# Patient Record
Sex: FEMALE | Race: WHITE | NOT HISPANIC OR LATINO | URBAN - METROPOLITAN AREA
[De-identification: names, ages, dates, MRNs, and addresses within clinical notes are randomized per-mention and may not be internally consistent; named-entity substitution may affect disease eponyms.]

---

## 2021-10-28 ENCOUNTER — APPOINTMENT (RX ONLY)
Dept: URBAN - METROPOLITAN AREA CLINIC 374 | Facility: CLINIC | Age: 46
Setting detail: DERMATOLOGY
End: 2021-10-28

## 2021-10-28 DIAGNOSIS — L91.8 OTHER HYPERTROPHIC DISORDERS OF THE SKIN: ICD-10-CM

## 2021-10-28 DIAGNOSIS — D22 MELANOCYTIC NEVI: ICD-10-CM

## 2021-10-28 DIAGNOSIS — L82.0 INFLAMED SEBORRHEIC KERATOSIS: ICD-10-CM

## 2021-10-28 DIAGNOSIS — D18.0 HEMANGIOMA: ICD-10-CM

## 2021-10-28 DIAGNOSIS — L81.4 OTHER MELANIN HYPERPIGMENTATION: ICD-10-CM

## 2021-10-28 DIAGNOSIS — Z71.89 OTHER SPECIFIED COUNSELING: ICD-10-CM

## 2021-10-28 PROBLEM — D22.71 MELANOCYTIC NEVI OF RIGHT LOWER LIMB, INCLUDING HIP: Status: ACTIVE | Noted: 2021-10-28

## 2021-10-28 PROBLEM — D22.5 MELANOCYTIC NEVI OF TRUNK: Status: ACTIVE | Noted: 2021-10-28

## 2021-10-28 PROBLEM — D22.62 MELANOCYTIC NEVI OF LEFT UPPER LIMB, INCLUDING SHOULDER: Status: ACTIVE | Noted: 2021-10-28

## 2021-10-28 PROBLEM — D18.01 HEMANGIOMA OF SKIN AND SUBCUTANEOUS TISSUE: Status: ACTIVE | Noted: 2021-10-28

## 2021-10-28 PROBLEM — D22.72 MELANOCYTIC NEVI OF LEFT LOWER LIMB, INCLUDING HIP: Status: ACTIVE | Noted: 2021-10-28

## 2021-10-28 PROBLEM — D22.61 MELANOCYTIC NEVI OF RIGHT UPPER LIMB, INCLUDING SHOULDER: Status: ACTIVE | Noted: 2021-10-28

## 2021-10-28 PROCEDURE — ? SHAVE REMOVAL

## 2021-10-28 PROCEDURE — ? COUNSELING

## 2021-10-28 PROCEDURE — ? PHOTO-DOCUMENTATION

## 2021-10-28 PROCEDURE — 11301 SHAVE SKIN LESION 0.6-1.0 CM: CPT | Mod: 59

## 2021-10-28 PROCEDURE — 11302 SHAVE SKIN LESION 1.1-2.0 CM: CPT | Mod: 59

## 2021-10-28 PROCEDURE — ? SUNSCREEN RECOMMENDATIONS

## 2021-10-28 PROCEDURE — 99203 OFFICE O/P NEW LOW 30 MIN: CPT | Mod: 25

## 2021-10-28 PROCEDURE — 11200 RMVL SKIN TAGS UP TO&INC 15: CPT

## 2021-10-28 PROCEDURE — ? FULL BODY SKIN EXAM

## 2021-10-28 PROCEDURE — ? SKIN TAG REMOVAL

## 2021-10-28 ASSESSMENT — LOCATION ZONE DERM
LOCATION ZONE: TRUNK
LOCATION ZONE: ARM
LOCATION ZONE: LEG

## 2021-10-28 ASSESSMENT — LOCATION SIMPLE DESCRIPTION DERM
LOCATION SIMPLE: RIGHT THIGH
LOCATION SIMPLE: LOWER BACK
LOCATION SIMPLE: LEFT THIGH
LOCATION SIMPLE: LEFT UPPER BACK
LOCATION SIMPLE: LEFT UPPER ARM
LOCATION SIMPLE: RIGHT SHOULDER
LOCATION SIMPLE: CHEST
LOCATION SIMPLE: ABDOMEN
LOCATION SIMPLE: UPPER BACK
LOCATION SIMPLE: RIGHT UPPER ARM
LOCATION SIMPLE: LEFT SHOULDER

## 2021-10-28 ASSESSMENT — LOCATION DETAILED DESCRIPTION DERM
LOCATION DETAILED: LEFT ANTERIOR DISTAL THIGH
LOCATION DETAILED: LEFT LATERAL SUPERIOR CHEST
LOCATION DETAILED: EPIGASTRIC SKIN
LOCATION DETAILED: LEFT ANTERIOR PROXIMAL THIGH
LOCATION DETAILED: RIGHT LATERAL ABDOMEN
LOCATION DETAILED: SUPERIOR LUMBAR SPINE
LOCATION DETAILED: LEFT ANTERIOR PROXIMAL UPPER ARM
LOCATION DETAILED: RIGHT RIB CAGE
LOCATION DETAILED: RIGHT ANTERIOR DISTAL THIGH
LOCATION DETAILED: LEFT POSTERIOR SHOULDER
LOCATION DETAILED: LEFT INFERIOR LATERAL UPPER BACK
LOCATION DETAILED: INFERIOR THORACIC SPINE
LOCATION DETAILED: LEFT MEDIAL UPPER BACK
LOCATION DETAILED: RIGHT POSTERIOR SHOULDER
LOCATION DETAILED: SUPERIOR THORACIC SPINE
LOCATION DETAILED: LEFT RIB CAGE
LOCATION DETAILED: RIGHT ANTERIOR DISTAL UPPER ARM
LOCATION DETAILED: RIGHT ANTERIOR PROXIMAL UPPER ARM
LOCATION DETAILED: UPPER STERNUM
LOCATION DETAILED: LEFT MID-UPPER BACK
LOCATION DETAILED: RIGHT MEDIAL SUPERIOR CHEST
LOCATION DETAILED: RIGHT ANTERIOR PROXIMAL THIGH
LOCATION DETAILED: PERIUMBILICAL SKIN

## 2021-10-28 NOTE — PROCEDURE: REASSURANCE
[facility] [address] [facility] 
[phone] Patient: Romana Prow MRN: [id number] :1997 My Medications ASK your physician about these medications Instructions Each Dose to Equal  
 Morning Noon Evening Bedtime  
 cyclobenzaprine 10 mg tablet Commonly known as:  FLEXERIL Your last dose was: Your next dose is: Take 0.5 Tabs by mouth nightly. 5 mg PNV66-Iron Fumarate-FA-DSS-DHA 26-1.2- mg Cap Your last dose was: Your next dose is: Take 1 Tab by mouth daily. Indications: Pregnancy 1 Tab
Detail Level: Zone
Hide Additional Notes?: No

## 2021-10-28 NOTE — PROCEDURE: FULL BODY SKIN EXAM
Detail Level: Generalized
Instructions: This plan will send the code FBSE to the PM system.  DO NOT or CHANGE the price.
Price (Do Not Change): 0.00
Body Of Note (Please Add Your Own Text Here): monitor annually

## 2021-10-28 NOTE — PROCEDURE: SHAVE REMOVAL
Medical Necessity Information: It is in your best interest to select a reason for this procedure from the list below. All of these items fulfill various CMS LCD requirements except the new and changing color options.
Medical Necessity Clause: This procedure was medically necessary because the lesion that was treated was:
Lab: -[id number]
Lab Facility: 0
Detail Level: Detailed
Was A Bandage Applied: Yes
Size Of Lesion In Cm (Required): 1.2
Biopsy Method: Dermablade
Anesthesia Type: 1% lidocaine with epinephrine
Hemostasis: Aluminum Chloride and Electrocautery
Wound Care: Petrolatum
Render Path Notes In Note?: No
Consent was obtained from the patient. The risks and benefits to therapy were discussed in detail. Specifically, the risks of infection, scarring, bleeding, prolonged wound healing, incomplete removal, allergy to anesthesia, nerve injury and recurrence were addressed. Prior to the procedure, the treatment site was clearly identified and confirmed by the patient. All components of Universal Protocol/PAUSE Rule completed.
Post-Care Instructions: I reviewed with the patient in detail post-care instructions. Patient is to keep the biopsy site dry overnight, and then apply bacitracin twice daily until healed. Patient may apply hydrogen peroxide soaks to remove any crusting.
Notification Instructions: Patient will be notified of pathology results. However, patient instructed to call the office if not contacted within 2 weeks.
Billing Type: Third-Party Bill
Size Of Lesion In Cm (Required): 1

## 2021-10-28 NOTE — PROCEDURE: SKIN TAG REMOVAL
Medical Necessity Information: It is in your best interest to select a reason for this procedure from the list below. All of these items fulfill various CMS LCD requirements except the new and changing color options.
Add Associated Diagnoses If Applicable When Selecting Medical Necessity: Yes
Include Z78.9 (Other Specified Conditions Influencing Health Status) As An Associated Diagnosis?: No
Detail Level: Zone
Medical Necessity Clause: This procedure was medically necessary because the lesions that were treated were:
Anesthesia Type: 1% lidocaine with epinephrine
Consent: Written consent obtained and the risks of skin tag removal was reviewed with the patient including but not limited to bleeding, pigmentary change, infection, pain, and remote possibility of scarring.
Anesthesia Volume In Cc: 0

## 2023-06-05 ENCOUNTER — OFFICE VISIT (OUTPATIENT)
Dept: FAMILY MEDICINE | Facility: CLINIC | Age: 48
End: 2023-06-05
Payer: COMMERCIAL

## 2023-06-05 VITALS
DIASTOLIC BLOOD PRESSURE: 88 MMHG | HEIGHT: 68 IN | TEMPERATURE: 98.1 F | WEIGHT: 242.6 LBS | SYSTOLIC BLOOD PRESSURE: 125 MMHG | HEART RATE: 84 BPM | OXYGEN SATURATION: 97 % | BODY MASS INDEX: 36.77 KG/M2

## 2023-06-05 DIAGNOSIS — Z12.31 SCREENING MAMMOGRAM FOR BREAST CANCER: ICD-10-CM

## 2023-06-05 DIAGNOSIS — Z11.4 SCREENING FOR HIV (HUMAN IMMUNODEFICIENCY VIRUS): ICD-10-CM

## 2023-06-05 DIAGNOSIS — Z01.00 ROUTINE EYE EXAM: ICD-10-CM

## 2023-06-05 DIAGNOSIS — Z13.220 SCREENING FOR LIPID DISORDERS: ICD-10-CM

## 2023-06-05 DIAGNOSIS — M79.89 LEG SWELLING: ICD-10-CM

## 2023-06-05 DIAGNOSIS — Z11.59 NEED FOR HEPATITIS C SCREENING TEST: ICD-10-CM

## 2023-06-05 DIAGNOSIS — Z00.00 ENCOUNTER FOR GENERAL ADULT MEDICAL EXAMINATION WITHOUT ABNORMAL FINDINGS: Primary | ICD-10-CM

## 2023-06-05 DIAGNOSIS — Z12.11 SCREENING FOR COLON CANCER: ICD-10-CM

## 2023-06-05 PROCEDURE — 3008F BODY MASS INDEX DOCD: CPT

## 2023-06-05 PROCEDURE — 90715 TDAP VACCINE 7 YRS/> IM: CPT

## 2023-06-05 PROCEDURE — 99386 PREV VISIT NEW AGE 40-64: CPT | Mod: 25

## 2023-06-05 PROCEDURE — 90471 IMMUNIZATION ADMIN: CPT

## 2023-06-05 ASSESSMENT — ENCOUNTER SYMPTOMS
EYES NEGATIVE: 1
NEUROLOGICAL NEGATIVE: 1
SLEEP DISTURBANCE: 1
MUSCULOSKELETAL NEGATIVE: 1
RESPIRATORY NEGATIVE: 1
ENDOCRINE NEGATIVE: 1
CONSTITUTIONAL NEGATIVE: 1
ALLERGIC/IMMUNOLOGIC NEGATIVE: 1
GASTROINTESTINAL NEGATIVE: 1
HEMATOLOGIC/LYMPHATIC NEGATIVE: 1

## 2023-06-05 ASSESSMENT — PATIENT HEALTH QUESTIONNAIRE - PHQ9: SUM OF ALL RESPONSES TO PHQ9 QUESTIONS 1 & 2: 0

## 2023-06-05 NOTE — PROGRESS NOTES
[facility]  [address] [place] [address]  [phone]       Reason for visit:   Chief Complaint   Patient presents with   • Annual Exam      HPI   Sheba Bernal is a 47 y.o. female who presents for her annual exam, new patient.    She reports being under lots of stress recently,  with MS, had recent heart attack in fall . Feels she is going through perimenopause due to hormonal changes, moods, not sleeping well. She also reports b/l ankle swelling for a few years, worse with warmer weather.     Diet - could be better, eating with stress  Exercise - walks  Smoke No   Alcohol No   Drugs No   Lives with , 2 kids  Work teacher    OBGYN:   Mammogram Due Yes   Pap Smear Due No   Colonoscopy Yes   TDAP Due Yes   Flu Due No   COVID Vaccination Due No   Lipids Due Yes    History reviewed. No pertinent past medical history.  Past Surgical History:   Procedure Laterality Date   •  SECTION      x 2     Social History     Tobacco Use   • Smoking status: Never   • Smokeless tobacco: Never   Vaping Use   • Vaping Use: Never used   Substance Use Topics   • Alcohol use: Never   • Drug use: Never     Family History   Problem Relation Age of Onset   • Hypertension Biological Mother    • Diabetes Biological Father    • Heart attack Maternal Grandfather 53   • Parkinsonism Paternal Grandfather      Patient has no known allergies.  No current outpatient medications on file.     No current facility-administered medications for this visit.       Review of Systems   Constitutional: Negative.    HENT: Negative.    Eyes: Negative.    Respiratory: Negative.    Cardiovascular: Positive for leg swelling.   Gastrointestinal: Negative.    Endocrine: Negative.    Genitourinary: Negative.    Musculoskeletal: Negative.    Skin: Negative.    Allergic/Immunologic: Negative.    Neurological: Negative.    Hematological: Negative.    Psychiatric/Behavioral: Positive for sleep  "disturbance.     Objective   Vitals:    23 0859   BP: 125/88   BP Location: Right upper arm   Patient Position: Sitting   Pulse: 84   Temp: 36.7 °C (98.1 °F)   TempSrc: Oral   SpO2: 97%   Weight: 110 kg (242 lb 9.6 oz)   Height: 1.727 m (5' 8\")       Physical Exam  Constitutional:       General: She is not in acute distress.     Appearance: Normal appearance. She is not ill-appearing, toxic-appearing or diaphoretic.   HENT:      Head: Normocephalic and atraumatic.      Right Ear: Tympanic membrane normal.      Left Ear: Tympanic membrane normal.      Nose: Nose normal.      Mouth/Throat:      Mouth: Mucous membranes are moist.      Pharynx: Oropharynx is clear.   Eyes:      Extraocular Movements: Extraocular movements intact.      Conjunctiva/sclera: Conjunctivae normal.      Pupils: Pupils are equal, round, and reactive to light.   Neck:      Thyroid: No thyromegaly.   Cardiovascular:      Rate and Rhythm: Normal rate and regular rhythm.      Heart sounds: Normal heart sounds. No murmur heard.  Pulmonary:      Effort: Pulmonary effort is normal. No respiratory distress.      Breath sounds: Normal breath sounds. No wheezing.   Abdominal:      General: Bowel sounds are normal.      Palpations: Abdomen is soft.      Tenderness: There is no abdominal tenderness.   Musculoskeletal:         General: Normal range of motion.      Right lower le+ Edema present.      Left lower le+ Edema present.   Lymphadenopathy:      Cervical: No cervical adenopathy.   Skin:     General: Skin is warm and dry.   Neurological:      Mental Status: She is alert and oriented to person, place, and time.   Psychiatric:         Mood and Affect: Mood normal.         Behavior: Behavior normal.             No results found for: WBC, HGB, HCT, PLT, CHOL, TRIG, HDL, LDLDIRECT, ALT, AST, NA, K, CL, CREATININE, BUN, CO2, TSH, PSA, INR, HGBA1C, MICROALBUR        Assessment   Problem List Items Addressed This Visit    None  Visit Diagnoses  "    Encounter for general adult medical examination without abnormal findings     Incorporate aerobic exercise - goal is 30 mins most days of the week of moderate intensity exercise. Receive a flu shot annually in the fall. See the dentist twice a year for routine cleaning. See Dermatology for routine skin checks and protect your skin from the sun. Follow a low-cholesterol diet to lessen the risk of heart disease. Limit alcohol beverages to 5-7 or less per week. Wear seatbelt and do not text and drive.    Immunizations - tdap  Due for mammogram and colonscopy - orders placed  Pap UTD 2021   -  Primary    Relevant Orders    CBC and Differential    Comprehensive metabolic panel    TSH w reflex FT4    Leg swelling        b/l leg and ankle swelling, chronic  +1 on exam today  worse in warm weather  checking labs, if unremarkable will send to cardiology    Relevant Orders    CBC and Differential    Comprehensive metabolic panel    TSH w reflex FT4    Screening for lipid disorders        Relevant Orders    Lipid panel    Need for hepatitis C screening test        Relevant Orders    Hepatitis C antibody    Screening for HIV (human immunodeficiency virus)        Relevant Orders    HIV 1,2 AB P24 AG    Screening mammogram for breast cancer        Relevant Orders    BI SCREENING MAMMOGRAM BILATERAL(TOMOSYNTHESIS)    Screening for colon cancer        Relevant Orders    Direct Access Colonoscopy BMMSA    Routine eye exam        needs referral    Relevant Orders    Ambulatory referral to Ophthalmology          Follow up 1 year for physical or sooner if needed.       PALLAVI Matthew  6/5/2023

## 2023-06-15 LAB
BASOPHILS # BLD AUTO: 0.1 X10E3/UL (ref 0–0.2)
BASOPHILS NFR BLD AUTO: 1 %
EOSINOPHIL # BLD AUTO: 0.2 X10E3/UL (ref 0–0.4)
EOSINOPHIL NFR BLD AUTO: 3 %
ERYTHROCYTE [DISTWIDTH] IN BLOOD BY AUTOMATED COUNT: 13.6 % (ref 11.7–15.4)
HCT VFR BLD AUTO: 37.3 % (ref 34–46.6)
HGB BLD-MCNC: 12.6 G/DL (ref 11.1–15.9)
IMM GRANULOCYTES # BLD AUTO: 0 X10E3/UL (ref 0–0.1)
IMM GRANULOCYTES NFR BLD AUTO: 0 %
LYMPHOCYTES # BLD AUTO: 2.1 X10E3/UL (ref 0.7–3.1)
LYMPHOCYTES NFR BLD AUTO: 26 %
MCH RBC QN AUTO: 29 PG (ref 26.6–33)
MCHC RBC AUTO-ENTMCNC: 33.8 G/DL (ref 31.5–35.7)
MCV RBC AUTO: 86 FL (ref 79–97)
MONOCYTES # BLD AUTO: 0.6 X10E3/UL (ref 0.1–0.9)
MONOCYTES NFR BLD AUTO: 8 %
NEUTROPHILS # BLD AUTO: 5.1 X10E3/UL (ref 1.4–7)
NEUTROPHILS NFR BLD AUTO: 62 %
PLATELET # BLD AUTO: 346 X10E3/UL (ref 150–450)
RBC # BLD AUTO: 4.35 X10E6/UL (ref 3.77–5.28)
WBC # BLD AUTO: 8 X10E3/UL (ref 3.4–10.8)

## 2023-06-16 LAB
HCV IGG SERPL QL IA: NON REACTIVE
HIV 1+2 AB+HIV1 P24 AG SERPL QL IA: NON REACTIVE

## 2023-06-17 LAB
ALBUMIN SERPL-MCNC: 4.3 G/DL (ref 3.8–4.8)
ALBUMIN/GLOB SERPL: 1.7 {RATIO} (ref 1.2–2.2)
ALP SERPL-CCNC: 59 IU/L (ref 44–121)
ALT SERPL-CCNC: 11 IU/L (ref 0–32)
AST SERPL-CCNC: 14 IU/L (ref 0–40)
BILIRUB SERPL-MCNC: 0.3 MG/DL (ref 0–1.2)
BUN SERPL-MCNC: 11 MG/DL (ref 6–24)
BUN/CREAT SERPL: 13 (ref 9–23)
CALCIUM SERPL-MCNC: 9.4 MG/DL (ref 8.7–10.2)
CHLORIDE SERPL-SCNC: 104 MMOL/L (ref 96–106)
CHOLEST SERPL-MCNC: 171 MG/DL (ref 100–199)
CO2 SERPL-SCNC: 20 MMOL/L (ref 20–29)
CREAT SERPL-MCNC: 0.86 MG/DL (ref 0.57–1)
EGFRCR SERPLBLD CKD-EPI 2021: 83 ML/MIN/1.73
GLOBULIN SER CALC-MCNC: 2.6 G/DL (ref 1.5–4.5)
GLUCOSE SERPL-MCNC: 91 MG/DL (ref 70–99)
HDLC SERPL-MCNC: 37 MG/DL
LDLC SERPL CALC-MCNC: 113 MG/DL (ref 0–99)
POTASSIUM SERPL-SCNC: 5 MMOL/L (ref 3.5–5.2)
PROT SERPL-MCNC: 6.9 G/DL (ref 6–8.5)
SODIUM SERPL-SCNC: 138 MMOL/L (ref 134–144)
T4 FREE SERPL-MCNC: 1.06 NG/DL (ref 0.82–1.77)
TRIGL SERPL-MCNC: 118 MG/DL (ref 0–149)
TSH SERPL DL<=0.005 MIU/L-ACNC: 3.17 UIU/ML (ref 0.45–4.5)
VLDLC SERPL CALC-MCNC: 21 MG/DL (ref 5–40)

## 2023-06-20 ENCOUNTER — TELEPHONE (OUTPATIENT)
Dept: FAMILY MEDICINE | Facility: CLINIC | Age: 48
End: 2023-06-20
Payer: COMMERCIAL

## 2023-06-22 ENCOUNTER — HOSPITAL ENCOUNTER (OUTPATIENT)
Dept: RADIOLOGY | Age: 48
Discharge: HOME | End: 2023-06-22
Payer: COMMERCIAL

## 2023-06-22 DIAGNOSIS — Z12.31 SCREENING MAMMOGRAM FOR BREAST CANCER: ICD-10-CM

## 2023-06-22 PROCEDURE — 77067 SCR MAMMO BI INCL CAD: CPT

## 2023-06-26 ENCOUNTER — APPOINTMENT (OUTPATIENT)
Dept: RADIOLOGY | Age: 48
End: 2023-06-26
Payer: COMMERCIAL

## 2023-06-30 ENCOUNTER — OFFICE VISIT (OUTPATIENT)
Dept: FAMILY MEDICINE | Facility: CLINIC | Age: 48
End: 2023-06-30
Payer: COMMERCIAL

## 2023-06-30 VITALS
TEMPERATURE: 98.2 F | BODY MASS INDEX: 36.07 KG/M2 | HEIGHT: 68 IN | WEIGHT: 238 LBS | OXYGEN SATURATION: 98 % | SYSTOLIC BLOOD PRESSURE: 124 MMHG | HEART RATE: 85 BPM | DIASTOLIC BLOOD PRESSURE: 86 MMHG

## 2023-06-30 DIAGNOSIS — L23.7 POISON IVY DERMATITIS: Primary | ICD-10-CM

## 2023-06-30 PROCEDURE — 99213 OFFICE O/P EST LOW 20 MIN: CPT | Performed by: FAMILY MEDICINE

## 2023-06-30 PROCEDURE — 3008F BODY MASS INDEX DOCD: CPT | Performed by: FAMILY MEDICINE

## 2023-06-30 RX ORDER — PREDNISONE 20 MG/1
TABLET ORAL
Qty: 20 TABLET | Refills: 0 | Status: SHIPPED | OUTPATIENT
Start: 2023-06-30 | End: 2024-06-25 | Stop reason: ALTCHOICE

## 2023-06-30 RX ORDER — BETAMETHASONE VALERATE 1 MG/G
CREAM TOPICAL 2 TIMES DAILY
Qty: 45 G | Refills: 1 | Status: SHIPPED | OUTPATIENT
Start: 2023-06-30 | End: 2024-07-01

## 2023-06-30 NOTE — PROGRESS NOTES
"   [place] [address]  [phone]       Reason for visit:   Chief Complaint   Patient presents with   • Illness      HPI   Sheba Bernal is a 47 y.o. female who presents with poison ivy. She has a history. It is just on knee.      History reviewed. No pertinent past medical history.  Past Surgical History:   Procedure Laterality Date   •  SECTION      x 2     Social History     Tobacco Use   • Smoking status: Never   • Smokeless tobacco: Never   Vaping Use   • Vaping Use: Never used   Substance Use Topics   • Alcohol use: Never   • Drug use: Never      Social History     Social History Narrative    Do you wear your seatbelt? Yes    Do you have smoke detector in your home? Yes    Do you have a carbon monoxide detector in your home? 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      Family History   Problem Relation Age of Onset   • Hypertension Biological Mother    • Diabetes Biological Father    • Heart attack Maternal Grandfather 53   • Parkinsonism Paternal Grandfather      Patient has no known allergies.  Current Outpatient Medications   Medication Sig Dispense Refill   • betamethasone valerate (VALISONE) 0.1 % cream Apply topically 2 (two) times a day. 45 g 1   • predniSONE (DELTASONE) 20 mg tablet 3 pills daily for 3 days; then 2 pills daily for 4 days; then 1 pill daily for 3. 20 tablet 0     No current facility-administered medications for this visit.       Patient Active Problem List    Diagnosis Date Noted   • Poison ivy dermatitis 2023         Review of Systems   Skin: Positive for rash.     Objective   Vitals:    23 1512   BP: 124/86   BP Location: Left upper arm   Patient Position: Sitting   Pulse: 85   Temp: 36.8 °C (98.2 °F)   TempSrc: Temporal   SpO2: 98%   Weight: 108 kg (238 lb)   Height: 1.727 m (5' 8\")     Body mass index is 36.19 kg/m².  Physical Exam  Skin:     Comments: Multiple " areas of poison ivy         Procedures    Lab Results   Component Value Date    WBC 8.0 06/15/2023    HGB 12.6 06/15/2023    HCT 37.3 06/15/2023     06/15/2023    CHOL 171 06/15/2023    TRIG 118 06/15/2023    HDL 37 (L) 06/15/2023    LDLCALC 113 (H) 06/15/2023    ALT 11 06/15/2023    AST 14 06/15/2023     06/15/2023    K 5.0 06/15/2023     06/15/2023    CREATININE 0.86 06/15/2023    BUN 11 06/15/2023    CO2 20 06/15/2023    TSH 3.170 06/15/2023    GLUCOSE 91 06/15/2023    HEPCAB Non Reactive 06/15/2023           Assessment   Problem List Items Addressed This Visit        Infectious/Inflammatory    Poison ivy dermatitis - Primary     Prednisone taper         Relevant Medications    betamethasone valerate (VALISONE) 0.1 % cream           Harry A. Frankel, MD  6/30/2023

## 2023-07-06 ENCOUNTER — DOCUMENTATION (OUTPATIENT)
Dept: FAMILY MEDICINE | Facility: CLINIC | Age: 48
End: 2023-07-06
Payer: COMMERCIAL

## 2023-07-06 NOTE — PROGRESS NOTES
Marilyn Sanchez MA has completed a chart review for Sheba Bernal and have determined that the care gap has been satisfied.    Care Gap Source: [facility]    Care Gap(s) Identified: Colorectal Cancer Screening    Chart Review Completed: Yes    Colonoscopy referral given to pt on 6/5/2023, no outreach needed.

## 2024-03-19 ENCOUNTER — DOCUMENTATION (OUTPATIENT)
Dept: FAMILY MEDICINE | Facility: CLINIC | Age: 49
End: 2024-03-19
Payer: COMMERCIAL

## 2024-03-19 NOTE — PROGRESS NOTES
Marilyn Sanchez MA has completed a chart review for Sheba Bernal and have determined that the care gap has been satisfied.    Care Gap Source: [facility]    Care Gap(s) Identified: Colorectal Cancer Screening    Chart Review Completed: Yes    Pt completed colonoscopy on 12/27/2023, no outreach needed.

## 2024-06-25 ENCOUNTER — OFFICE VISIT (OUTPATIENT)
Dept: FAMILY MEDICINE | Facility: CLINIC | Age: 49
End: 2024-06-25
Payer: COMMERCIAL

## 2024-06-25 VITALS
HEART RATE: 74 BPM | WEIGHT: 235.8 LBS | OXYGEN SATURATION: 100 % | DIASTOLIC BLOOD PRESSURE: 84 MMHG | HEIGHT: 68 IN | SYSTOLIC BLOOD PRESSURE: 126 MMHG | TEMPERATURE: 97.7 F | BODY MASS INDEX: 35.74 KG/M2

## 2024-06-25 DIAGNOSIS — Z00.00 ANNUAL PHYSICAL EXAM: Primary | ICD-10-CM

## 2024-06-25 DIAGNOSIS — E66.812 CLASS 2 OBESITY DUE TO EXCESS CALORIES WITH BODY MASS INDEX (BMI) OF 35.0 TO 35.9 IN ADULT, UNSPECIFIED WHETHER SERIOUS COMORBIDITY PRESENT: ICD-10-CM

## 2024-06-25 DIAGNOSIS — M79.89 LEG SWELLING: ICD-10-CM

## 2024-06-25 DIAGNOSIS — L23.7 POISON IVY DERMATITIS: ICD-10-CM

## 2024-06-25 DIAGNOSIS — E66.09 CLASS 2 OBESITY DUE TO EXCESS CALORIES WITH BODY MASS INDEX (BMI) OF 35.0 TO 35.9 IN ADULT, UNSPECIFIED WHETHER SERIOUS COMORBIDITY PRESENT: ICD-10-CM

## 2024-06-25 DIAGNOSIS — Z12.31 SCREENING MAMMOGRAM FOR BREAST CANCER: ICD-10-CM

## 2024-06-25 DIAGNOSIS — Z13.220 SCREENING FOR LIPID DISORDERS: ICD-10-CM

## 2024-06-25 PROCEDURE — 99396 PREV VISIT EST AGE 40-64: CPT | Mod: 25

## 2024-06-25 PROCEDURE — 90480 ADMN SARSCOV2 VAC 1/ONLY CMP: CPT

## 2024-06-25 PROCEDURE — 91322 SARSCOV2 VAC 50 MCG/0.5ML IM: CPT

## 2024-06-25 PROCEDURE — 3008F BODY MASS INDEX DOCD: CPT

## 2024-06-25 RX ORDER — ESTRADIOL 0.05 MG/D
1 PATCH TRANSDERMAL
COMMUNITY
Start: 2024-06-18 | End: 2025-06-18

## 2024-06-25 RX ORDER — TIRZEPATIDE 2.5 MG/.5ML
2.5 INJECTION, SOLUTION SUBCUTANEOUS
Qty: 2 ML | Refills: 0 | Status: SHIPPED | OUTPATIENT
Start: 2024-06-25 | End: 2024-07-25 | Stop reason: DRUGHIGH

## 2024-06-25 RX ORDER — PREDNISONE 20 MG/1
TABLET ORAL
Qty: 20 TABLET | Refills: 0 | Status: SHIPPED | OUTPATIENT
Start: 2024-06-25 | End: 2024-07-30 | Stop reason: SDUPTHER

## 2024-06-25 ASSESSMENT — ENCOUNTER SYMPTOMS
SHORTNESS OF BREATH: 0
NAUSEA: 0
DIFFICULTY URINATING: 0
LIGHT-HEADEDNESS: 0
PALPITATIONS: 0
BRUISES/BLEEDS EASILY: 0
NUMBNESS: 0
ACTIVITY CHANGE: 0
VOMITING: 0
POLYPHAGIA: 0
COLOR CHANGE: 0
HEADACHES: 0
WHEEZING: 0
DYSPHORIC MOOD: 0
ABDOMINAL PAIN: 0
WEAKNESS: 0
CHEST TIGHTNESS: 0
NERVOUS/ANXIOUS: 0
ARTHRALGIAS: 0
DIARRHEA: 0
COUGH: 0
FEVER: 0
SLEEP DISTURBANCE: 0
DIZZINESS: 0
POLYDIPSIA: 0
CONSTIPATION: 0
APPETITE CHANGE: 0
SORE THROAT: 0
FATIGUE: 0
MYALGIAS: 0
CHILLS: 0

## 2024-06-25 ASSESSMENT — PATIENT HEALTH QUESTIONNAIRE - PHQ9: SUM OF ALL RESPONSES TO PHQ9 QUESTIONS 1 & 2: 0

## 2024-06-25 NOTE — ASSESSMENT & PLAN NOTE
Chronic issues  Bilateral LE, worse in warm weather  Elevate legs, compression stockings  See vascular

## 2024-06-25 NOTE — ASSESSMENT & PLAN NOTE
interested in GLP1  She is excellent candidate based on BMI 35.85 and has has attempted multiple lifestyle interventions for 6 months without sustained weight loss >= 5%   She checked with insurance and is covered  No personal or family history of medullary thyroid cancer, MEN2  No history of pancreatitis or gastroparesis  Titration of medication, potential SE's, shortage issues and need for long term use discussed  Referral to nutrition  Rx for Zepbound 2.5 mg sent  Follow up 3 months

## 2024-06-25 NOTE — PROGRESS NOTES
[facility]  [address] [place] [address]  [place] [address]  [phone]       Reason for visit:   Chief Complaint   Patient presents with    Annual Exam      HPI   Sheba Bernal is a 48 y.o. female who presents for her annual exam.      Struggling with losing weight  Has been around 235 lb for many years, has not been able to lose weight despite lifestyle changes for the past 6 months  Working with nutritionist, exercises daily walks      Intermittent b/l leg/ankle swelling. Worse in the heat or when standing/sitting a lot   Chronic issue for many years, saw vascular specialist about 5 years ago  No sob or cp    Poison ivy/plant rash - intermittent rash for the past month,  has kitten that goes outdoors and believes will get oils on her    Screenings:  Pap - UTD  Mammogram - Due   Colonoscopy - UTD 2023 - 10 yr follow up     Immunizations: due covid    Diet - healthy, following with nutritionist  Exercise - walks nightly  Work teacher, special ed     Wt Readings from Last 3 Encounters:   24 107 kg (235 lb 12.8 oz)   23 108 kg (238 lb)   23 110 kg (242 lb 9.6 oz)          History reviewed. No pertinent past medical history.  Past Surgical History:   Procedure Laterality Date     SECTION      x 2     Social History     Tobacco Use    Smoking status: Never    Smokeless tobacco: Never   Vaping Use    Vaping Use: Never used   Substance Use Topics    Alcohol use: Never    Drug use: Never     Family History   Problem Relation Age of Onset    Hypertension Biological Mother     Diabetes Biological Father     Heart attack Maternal Grandfather 53    Parkinsonism Paternal Grandfather      Patient has no known allergies.  Current Outpatient Medications   Medication Sig Dispense Refill    estradioL (CLIMARA) 0.05 mg/24 hr Place 1 patch on the skin.      predniSONE (DELTASONE) 20 mg tablet 3 pills daily for 3 days; then 2 pills daily for 4 days; then 1 pill daily for  "3. 20 tablet 0    tirzepatide, weight loss, (ZEPBOUND) 2.5 mg/0.5 mL subcutaneous injection Inject 0.5 mL (2.5 mg total) under the skin every (seven) 7 days. 2 mL 0    betamethasone valerate (VALISONE) 0.1 % cream Apply topically 2 (two) times a day. 45 g 1     No current facility-administered medications for this visit.       Review of Systems   Constitutional:  Negative for activity change, appetite change, chills, fatigue and fever.   HENT:  Negative for congestion and sore throat.    Eyes:  Negative for visual disturbance.   Respiratory:  Negative for cough, chest tightness, shortness of breath and wheezing.    Cardiovascular:  Positive for leg swelling. Negative for chest pain and palpitations.   Gastrointestinal:  Negative for abdominal pain, constipation, diarrhea, nausea and vomiting.   Endocrine: Negative for cold intolerance, heat intolerance, polydipsia, polyphagia and polyuria.   Genitourinary:  Negative for difficulty urinating.   Musculoskeletal:  Negative for arthralgias and myalgias.   Skin:  Negative for color change and rash.   Neurological:  Negative for dizziness, syncope, weakness, light-headedness, numbness and headaches.   Hematological:  Does not bruise/bleed easily.   Psychiatric/Behavioral:  Negative for dysphoric mood and sleep disturbance. The patient is not nervous/anxious.      Objective   Vitals:    06/25/24 1519   BP: 126/84   BP Location: Right upper arm   Patient Position: Sitting   Pulse: 74   Temp: 36.5 °C (97.7 °F)   TempSrc: Oral   SpO2: 100%   Weight: 107 kg (235 lb 12.8 oz)   Height: 1.727 m (5' 8\")       Physical Exam  Constitutional:       General: She is not in acute distress.     Appearance: Normal appearance. She is not ill-appearing, toxic-appearing or diaphoretic.   HENT:      Head: Normocephalic and atraumatic.      Right Ear: Tympanic membrane normal.      Left Ear: Tympanic membrane normal.      Nose: Nose normal.      Mouth/Throat:      Mouth: Mucous membranes are " moist.      Pharynx: Oropharynx is clear.   Eyes:      Extraocular Movements: Extraocular movements intact.      Conjunctiva/sclera: Conjunctivae normal.      Pupils: Pupils are equal, round, and reactive to light.   Neck:      Thyroid: No thyromegaly.   Cardiovascular:      Rate and Rhythm: Normal rate and regular rhythm.      Heart sounds: Normal heart sounds. No murmur heard.  Pulmonary:      Effort: Pulmonary effort is normal. No respiratory distress.      Breath sounds: Normal breath sounds. No wheezing.   Abdominal:      General: Bowel sounds are normal.      Palpations: Abdomen is soft.      Tenderness: There is no abdominal tenderness.   Musculoskeletal:         General: Normal range of motion.      Right lower leg: No edema.      Left lower leg: No edema.   Lymphadenopathy:      Cervical: No cervical adenopathy.   Skin:     General: Skin is warm and dry.   Neurological:      Mental Status: She is alert and oriented to person, place, and time.   Psychiatric:         Mood and Affect: Mood normal.         Behavior: Behavior normal.             Lab Results   Component Value Date    WBC 8.0 06/15/2023    HGB 12.6 06/15/2023    HCT 37.3 06/15/2023     06/15/2023    CHOL 171 06/15/2023    TRIG 118 06/15/2023    HDL 37 (L) 06/15/2023    ALT 11 06/15/2023    AST 14 06/15/2023     06/15/2023    K 5.0 06/15/2023     06/15/2023    CREATININE 0.86 06/15/2023    BUN 11 06/15/2023    CO2 20 06/15/2023    TSH 3.170 06/15/2023           Assessment   Problem List Items Addressed This Visit       Poison ivy dermatitis     Prednisone taper  Follow up if symptoms worsen or persist.           Class 2 obesity due to excess calories with body mass index (BMI) of 35.0 to 35.9 in adult     interested in GLP1  She is excellent candidate based on BMI 35.85 and has has attempted multiple lifestyle interventions for 6 months without sustained weight loss >= 5%   She checked with insurance and is covered  No personal  or family history of medullary thyroid cancer, MEN2  No history of pancreatitis or gastroparesis  Titration of medication, potential SE's, shortage issues and need for long term use discussed  Referral to nutrition  Rx for Zepbound 2.5 mg sent  Follow up 3 months           Relevant Orders    Ambulatory referral to Nutrition Services    CBC and Differential    Comprehensive metabolic panel    Lipid panel    TSH w reflex FT4    Leg swelling     Chronic issues  Bilateral LE, worse in warm weather  Elevate legs, compression stockings  See vascular           Other Visit Diagnoses       Annual physical exam    -  Primary  Routine labs  Counseled on general health maintenance, healthy diet and exercise.   Continue with routine dental/eye exams.  Immunizations - covid today  Screenings -pap and colonoscopy UTD, mammogram due      Relevant Orders    CBC and Differential    Comprehensive metabolic panel    Lipid panel    TSH w reflex FT4    Screening for lipid disorders        Relevant Orders    Lipid panel    Screening mammogram for breast cancer        Relevant Orders    BI SCREENING MAMMOGRAM BILATERAL(TOMOSYNTHESIS)           Follow up 3 months     PALLAVI Matthew  6/25/2024

## 2024-06-27 ENCOUNTER — TELEPHONE (OUTPATIENT)
Dept: FAMILY MEDICINE | Facility: CLINIC | Age: 49
End: 2024-06-27
Payer: COMMERCIAL

## 2024-06-27 LAB
BASOPHILS # BLD AUTO: 0.1 X10E3/UL (ref 0–0.2)
BASOPHILS NFR BLD AUTO: 1 %
EOSINOPHIL # BLD AUTO: 0.2 X10E3/UL (ref 0–0.4)
EOSINOPHIL NFR BLD AUTO: 4 %
ERYTHROCYTE [DISTWIDTH] IN BLOOD BY AUTOMATED COUNT: 13.3 % (ref 11.7–15.4)
HCT VFR BLD AUTO: 36.8 % (ref 34–46.6)
HGB BLD-MCNC: 11.9 G/DL (ref 11.1–15.9)
IMM GRANULOCYTES # BLD AUTO: 0 X10E3/UL (ref 0–0.1)
IMM GRANULOCYTES NFR BLD AUTO: 0 %
LYMPHOCYTES # BLD AUTO: 1.6 X10E3/UL (ref 0.7–3.1)
LYMPHOCYTES NFR BLD AUTO: 28 %
MCH RBC QN AUTO: 28.4 PG (ref 26.6–33)
MCHC RBC AUTO-ENTMCNC: 32.3 G/DL (ref 31.5–35.7)
MCV RBC AUTO: 88 FL (ref 79–97)
MONOCYTES # BLD AUTO: 0.8 X10E3/UL (ref 0.1–0.9)
MONOCYTES NFR BLD AUTO: 14 %
NEUTROPHILS # BLD AUTO: 3.1 X10E3/UL (ref 1.4–7)
NEUTROPHILS NFR BLD AUTO: 53 %
PLATELET # BLD AUTO: 272 X10E3/UL (ref 150–450)
RBC # BLD AUTO: 4.19 X10E6/UL (ref 3.77–5.28)
WBC # BLD AUTO: 5.7 X10E3/UL (ref 3.4–10.8)

## 2024-06-27 NOTE — TELEPHONE ENCOUNTER
Started PA in [facility].    Faxed notes awaiting confrimation prior to sending to plan.    GUANAKITO ARMANDO ([place]: [id number])

## 2024-06-27 NOTE — TELEPHONE ENCOUNTER
----- Message from PALLAVI Jonas sent at 6/25/2024  7:47 PM EDT -----  Rx sent for Zepbound if you could please start prior auth. Thanks!

## 2024-06-28 ENCOUNTER — HOSPITAL ENCOUNTER (OUTPATIENT)
Dept: RADIOLOGY | Facility: CLINIC | Age: 49
Discharge: HOME | End: 2024-06-28
Payer: COMMERCIAL

## 2024-06-28 DIAGNOSIS — Z12.31 SCREENING MAMMOGRAM FOR BREAST CANCER: ICD-10-CM

## 2024-06-28 LAB
ALBUMIN SERPL-MCNC: 4.1 G/DL (ref 3.9–4.9)
ALP SERPL-CCNC: 61 IU/L (ref 44–121)
ALT SERPL-CCNC: 9 IU/L (ref 0–32)
AST SERPL-CCNC: 13 IU/L (ref 0–40)
BILIRUB SERPL-MCNC: 0.4 MG/DL (ref 0–1.2)
BUN SERPL-MCNC: 11 MG/DL (ref 6–24)
BUN/CREAT SERPL: 12 (ref 9–23)
CALCIUM SERPL-MCNC: 9.2 MG/DL (ref 8.7–10.2)
CHLORIDE SERPL-SCNC: 105 MMOL/L (ref 96–106)
CHOLEST SERPL-MCNC: 151 MG/DL (ref 100–199)
CO2 SERPL-SCNC: 21 MMOL/L (ref 20–29)
CREAT SERPL-MCNC: 0.91 MG/DL (ref 0.57–1)
EGFRCR SERPLBLD CKD-EPI 2021: 77 ML/MIN/1.73
GLOBULIN SER CALC-MCNC: 2.3 G/DL (ref 1.5–4.5)
GLUCOSE SERPL-MCNC: 98 MG/DL (ref 70–99)
HDLC SERPL-MCNC: 35 MG/DL
LDLC SERPL CALC-MCNC: 99 MG/DL (ref 0–99)
POTASSIUM SERPL-SCNC: 4.5 MMOL/L (ref 3.5–5.2)
PROT SERPL-MCNC: 6.4 G/DL (ref 6–8.5)
SODIUM SERPL-SCNC: 140 MMOL/L (ref 134–144)
T4 FREE SERPL-MCNC: 1.19 NG/DL (ref 0.82–1.77)
TRIGL SERPL-MCNC: 88 MG/DL (ref 0–149)
TSH SERPL DL<=0.005 MIU/L-ACNC: 3.35 UIU/ML (ref 0.45–4.5)
VLDLC SERPL CALC-MCNC: 17 MG/DL (ref 5–40)

## 2024-06-28 PROCEDURE — 77063 BREAST TOMOSYNTHESIS BI: CPT

## 2024-07-01 RX ORDER — BETAMETHASONE VALERATE 1 MG/G
CREAM TOPICAL 2 TIMES DAILY
Qty: 45 G | Refills: 0 | Status: SHIPPED | OUTPATIENT
Start: 2024-07-01 | End: 2024-07-31

## 2024-07-09 NOTE — TELEPHONE ENCOUNTER
GUANAKITO ARMANDO (Key: [id number])  PA Case ID #: [id number]  Need Help? Call us at [phone]  Outcome  Approved on July 6  PA Case: [id number], Status: Approved, Coverage Starts on: 7/1/2024 12:00 AM, Coverage Ends on: 7/1/2025 12:00 AM. Questions? Contact [phone].  Authorization Expiration Date: 6/30/2025

## 2024-07-25 RX ORDER — TIRZEPATIDE 2.5 MG/.5ML
2.5 INJECTION, SOLUTION SUBCUTANEOUS
Qty: 2 ML | Refills: 0 | Status: CANCELLED | OUTPATIENT
Start: 2024-07-25

## 2024-07-25 RX ORDER — TIRZEPATIDE 5 MG/.5ML
5 INJECTION, SOLUTION SUBCUTANEOUS
Qty: 2 ML | Refills: 0 | Status: SHIPPED | OUTPATIENT
Start: 2024-07-25 | End: 2024-09-03 | Stop reason: DRUGHIGH

## 2024-07-29 RX ORDER — PREDNISONE 20 MG/1
TABLET ORAL
Qty: 20 TABLET | Refills: 0 | OUTPATIENT
Start: 2024-07-29

## 2024-07-30 ENCOUNTER — OFFICE VISIT (OUTPATIENT)
Dept: FAMILY MEDICINE | Facility: CLINIC | Age: 49
End: 2024-07-30
Payer: COMMERCIAL

## 2024-07-30 VITALS
HEART RATE: 101 BPM | DIASTOLIC BLOOD PRESSURE: 72 MMHG | WEIGHT: 228 LBS | BODY MASS INDEX: 34.56 KG/M2 | RESPIRATION RATE: 18 BRPM | TEMPERATURE: 97.6 F | OXYGEN SATURATION: 99 % | SYSTOLIC BLOOD PRESSURE: 128 MMHG | HEIGHT: 68 IN

## 2024-07-30 DIAGNOSIS — L23.7 POISON IVY DERMATITIS: Primary | ICD-10-CM

## 2024-07-30 PROCEDURE — 99213 OFFICE O/P EST LOW 20 MIN: CPT

## 2024-07-30 PROCEDURE — 3008F BODY MASS INDEX DOCD: CPT

## 2024-07-30 RX ORDER — PREDNISONE 20 MG/1
TABLET ORAL
Qty: 20 TABLET | Refills: 0 | Status: SHIPPED | OUTPATIENT
Start: 2024-07-30 | End: 2025-01-28

## 2024-07-30 ASSESSMENT — ENCOUNTER SYMPTOMS
CHEST TIGHTNESS: 0
FEVER: 0
COUGH: 0
SHORTNESS OF BREATH: 0
CHILLS: 0

## 2024-07-30 NOTE — PROGRESS NOTES
[facility]      Reason for visit:   Chief Complaint   Patient presents with    Illness      Sheba Bernal is a 48 y.o. female who presents for sick visit.    She reports her cat was outside in weeds and after carrying her in the house   developed rash to right wrist , under chin and now spreading on both forearms, chest, lower legs. +itching. Was drying up but some blistering.   Had leftover prednisone prescription so tired to take for 2 days but rash returned     No fever/chills.          History reviewed. No pertinent past medical history.    Past Surgical History:   Procedure Laterality Date     SECTION      x 2       Social History     Tobacco Use    Smoking status: Never    Smokeless tobacco: Never   Vaping Use    Vaping Use: Never used   Substance Use Topics    Alcohol use: Never    Drug use: Never       Family History   Problem Relation Age of Onset    Hypertension Biological Mother     Diabetes Biological Father     Heart attack Maternal Grandfather 53    Parkinsonism Paternal Grandfather        Patient has no known allergies.    Current Outpatient Medications on File Prior to Visit   Medication Sig Dispense Refill    betamethasone valerate (VALISONE) 0.1 % cream APPLY TOPICALLY 2 (TWO) TIMES A DAY. 45 g 0    estradioL (CLIMARA) 0.05 mg/24 hr Place 1 patch on the skin.      tirzepatide, weight loss, (ZEPBOUND) 5 mg/0.5 mL subcutaneous injection Inject 0.5 mL (5 mg total) under the skin every (seven) 7 days. 2 mL 0     No current facility-administered medications on file prior to visit.       Review of Systems   Constitutional:  Negative for chills and fever.   Respiratory:  Negative for cough, chest tightness and shortness of breath.    Cardiovascular:  Negative for chest pain.   Skin:  Positive for rash.       Objective   Vitals:    24 1422   BP: 128/72   BP Location: Left upper arm   Patient Position: Sitting   Pulse: (!) 101   Resp: 18   Temp: 36.4 °C (97.6 °F)   TempSrc: Temporal   SpO2: 99%  "  Weight: 103 kg (228 lb)   Height: 1.727 m (5' 8\")     Body mass index is 34.67 kg/m².    Physical Exam  Constitutional:       General: She is not in acute distress.     Appearance: Normal appearance. She is not ill-appearing.   Musculoskeletal:      Right lower leg: No edema.      Left lower leg: No edema.   Skin:     General: Skin is warm and dry.      Findings: Rash present. Rash is crusting, papular and vesicular.      Comments: Scattered area of red raised rash, some with vesicles. To b/l forearms, lower chin, upper chest, and right lower leg   Neurological:      Mental Status: She is alert and oriented to person, place, and time.   Psychiatric:         Mood and Affect: Mood normal.         Behavior: Behavior normal.         Procedures    Lab Results   Component Value Date    WBC 5.7 06/27/2024    HGB 11.9 06/27/2024    HCT 36.8 06/27/2024     06/27/2024    CHOL 151 06/27/2024    TRIG 88 06/27/2024    HDL 35 (L) 06/27/2024    ALT 9 06/27/2024    AST 13 06/27/2024     06/27/2024    K 4.5 06/27/2024     06/27/2024    CREATININE 0.91 06/27/2024    BUN 11 06/27/2024    CO2 21 06/27/2024    TSH 3.350 06/27/2024           Assessment   Problem List Items Addressed This Visit       Poison ivy dermatitis - Primary  Prednisone taper  3 occurences in past year, discussed ways to avoid and supportive care, removing oils  If continue to persist recommend see derm           PALLAVI Matthew  7/30/2024     "

## 2024-07-30 NOTE — TELEPHONE ENCOUNTER
Please call pt she needs an appointment. Would prefer in person but if she can only do telemed before her trip that is ok.

## 2024-08-30 DIAGNOSIS — E66.812 CLASS 2 OBESITY DUE TO EXCESS CALORIES WITH BODY MASS INDEX (BMI) OF 35.0 TO 35.9 IN ADULT, UNSPECIFIED WHETHER SERIOUS COMORBIDITY PRESENT: Primary | ICD-10-CM

## 2024-08-30 DIAGNOSIS — E66.09 CLASS 2 OBESITY DUE TO EXCESS CALORIES WITH BODY MASS INDEX (BMI) OF 35.0 TO 35.9 IN ADULT, UNSPECIFIED WHETHER SERIOUS COMORBIDITY PRESENT: Primary | ICD-10-CM

## 2024-08-30 RX ORDER — TIRZEPATIDE 5 MG/.5ML
5 INJECTION, SOLUTION SUBCUTANEOUS
Qty: 2 ML | Refills: 0 | Status: CANCELLED | OUTPATIENT
Start: 2024-08-30

## 2024-09-03 ENCOUNTER — TELEPHONE (OUTPATIENT)
Dept: FAMILY MEDICINE | Facility: CLINIC | Age: 49
End: 2024-09-03
Payer: COMMERCIAL

## 2024-09-03 RX ORDER — TIRZEPATIDE 7.5 MG/.5ML
7.5 INJECTION, SOLUTION SUBCUTANEOUS
Qty: 2 ML | Refills: 0 | Status: SHIPPED | OUTPATIENT
Start: 2024-09-03 | End: 2024-09-30 | Stop reason: SDUPTHER

## 2024-09-03 NOTE — TELEPHONE ENCOUNTER
Please contact patient regarding Zepbound refill. Does she want to increase the dose or stay on the current dose?  Thank you.

## 2024-09-03 NOTE — TELEPHONE ENCOUNTER
Yes I am ready to increase the dose    please answer the following questions:   What is your most recent weight? 221  What is your current zepbound  dose? 5     Side effect questions:   Are you having any bowel difficulties? Hard stool  Are you having any nausea? No  Are you having any abdominal pain? No     Nutrition intake questions:   Are you able to drink the recommended 64 oz of water daily? Yes  Are you able to eat 3 meals a day, including protein? Yes

## 2024-09-10 ENCOUNTER — APPOINTMENT (RX ONLY)
Dept: URBAN - METROPOLITAN AREA CLINIC 374 | Facility: CLINIC | Age: 49
Setting detail: DERMATOLOGY
End: 2024-09-10

## 2024-09-10 VITALS — WEIGHT: 220 LBS

## 2024-09-10 DIAGNOSIS — L259 CONTACT DERMATITIS AND OTHER ECZEMA, UNSPECIFIED CAUSE: ICD-10-CM | Status: INADEQUATELY CONTROLLED

## 2024-09-10 PROBLEM — L23.7 ALLERGIC CONTACT DERMATITIS DUE TO PLANTS, EXCEPT FOOD: Status: ACTIVE | Noted: 2024-09-10

## 2024-09-10 PROCEDURE — ? COUNSELING

## 2024-09-10 PROCEDURE — ? PRESCRIPTION MEDICATION MANAGEMENT

## 2024-09-10 PROCEDURE — 99203 OFFICE O/P NEW LOW 30 MIN: CPT

## 2024-09-10 PROCEDURE — ? PRESCRIPTION

## 2024-09-10 PROCEDURE — ? ADDITIONAL NOTES

## 2024-09-10 RX ORDER — PREDNISONE 20 MG/1
TABLET ORAL
Qty: 30 | Refills: 0 | Status: ERX | COMMUNITY
Start: 2024-09-10

## 2024-09-10 RX ORDER — CLOBETASOL PROPIONATE 0.5 MG/G
OINTMENT TOPICAL BID
Qty: 60 | Refills: 0 | Status: ERX | COMMUNITY
Start: 2024-09-10

## 2024-09-10 RX ADMIN — CLOBETASOL PROPIONATE: 0.5 OINTMENT TOPICAL at 00:00

## 2024-09-10 RX ADMIN — PREDNISONE: 20 TABLET ORAL at 00:00

## 2024-09-10 ASSESSMENT — LOCATION ZONE DERM
LOCATION ZONE: ARM
LOCATION ZONE: LEG
LOCATION ZONE: TRUNK

## 2024-09-10 ASSESSMENT — LOCATION SIMPLE DESCRIPTION DERM
LOCATION SIMPLE: LEFT PRETIBIAL REGION
LOCATION SIMPLE: RIGHT PRETIBIAL REGION
LOCATION SIMPLE: LEFT UPPER ARM
LOCATION SIMPLE: RIGHT UPPER ARM
LOCATION SIMPLE: ABDOMEN

## 2024-09-10 ASSESSMENT — LOCATION DETAILED DESCRIPTION DERM
LOCATION DETAILED: RIGHT DISTAL PRETIBIAL REGION
LOCATION DETAILED: RIGHT ANTERIOR DISTAL UPPER ARM
LOCATION DETAILED: LEFT ANTERIOR DISTAL UPPER ARM
LOCATION DETAILED: PERIUMBILICAL SKIN
LOCATION DETAILED: LEFT PROXIMAL PRETIBIAL REGION

## 2024-09-10 ASSESSMENT — PAIN INTENSITY VAS: HOW INTENSE IS YOUR PAIN 0 BEING NO PAIN, 10 BEING THE MOST SEVERE PAIN POSSIBLE?: NO PAIN

## 2024-09-10 ASSESSMENT — ITCH NUMERIC RATING SCALE: HOW SEVERE IS YOUR ITCHING?: 9

## 2024-09-10 ASSESSMENT — BSA RASH: BSA RASH: 30

## 2024-09-10 ASSESSMENT — SEVERITY ASSESSMENT: SEVERITY: MODERATE TO SEVERE

## 2024-09-10 NOTE — PROCEDURE: PRESCRIPTION MEDICATION MANAGEMENT
Initiate Treatment: prednisone 20 mg tablet Take 3 tabs (60 mg) po in AM for 5 days, then take 2 tabs (40 mg) po in AM for the next 5 days, and finally take 1 tab (20 mg) po in AM for the last 5 days\\n\\nclobetasol 0.05 % topical ointmentApply to affected area bid for  2  weeks then stop
Detail Level: Zone
Render In Strict Bullet Format?: No

## 2024-09-30 DIAGNOSIS — E66.09 CLASS 2 OBESITY DUE TO EXCESS CALORIES WITH BODY MASS INDEX (BMI) OF 35.0 TO 35.9 IN ADULT, UNSPECIFIED WHETHER SERIOUS COMORBIDITY PRESENT: ICD-10-CM

## 2024-09-30 DIAGNOSIS — E66.812 CLASS 2 OBESITY DUE TO EXCESS CALORIES WITH BODY MASS INDEX (BMI) OF 35.0 TO 35.9 IN ADULT, UNSPECIFIED WHETHER SERIOUS COMORBIDITY PRESENT: ICD-10-CM

## 2024-09-30 RX ORDER — TIRZEPATIDE 7.5 MG/.5ML
7.5 INJECTION, SOLUTION SUBCUTANEOUS
Qty: 2 ML | Refills: 0 | Status: SHIPPED | OUTPATIENT
Start: 2024-09-30 | End: 2024-10-25 | Stop reason: SDUPTHER

## 2024-10-21 ENCOUNTER — APPOINTMENT (RX ONLY)
Dept: URBAN - METROPOLITAN AREA CLINIC 374 | Facility: CLINIC | Age: 49
Setting detail: DERMATOLOGY
End: 2024-10-21

## 2024-10-21 DIAGNOSIS — L91.8 OTHER HYPERTROPHIC DISORDERS OF THE SKIN: ICD-10-CM | Status: UNCHANGED

## 2024-10-21 DIAGNOSIS — Z71.89 OTHER SPECIFIED COUNSELING: ICD-10-CM

## 2024-10-21 DIAGNOSIS — D22 MELANOCYTIC NEVI: ICD-10-CM | Status: UNCHANGED

## 2024-10-21 DIAGNOSIS — L82.1 OTHER SEBORRHEIC KERATOSIS: ICD-10-CM | Status: UNCHANGED

## 2024-10-21 DIAGNOSIS — L81.4 OTHER MELANIN HYPERPIGMENTATION: ICD-10-CM | Status: UNCHANGED

## 2024-10-21 DIAGNOSIS — L81.5 LEUKODERMA, NOT ELSEWHERE CLASSIFIED: ICD-10-CM | Status: UNCHANGED

## 2024-10-21 DIAGNOSIS — L259 CONTACT DERMATITIS AND OTHER ECZEMA, UNSPECIFIED CAUSE: ICD-10-CM | Status: RESOLVED

## 2024-10-21 DIAGNOSIS — D18.0 HEMANGIOMA: ICD-10-CM | Status: UNCHANGED

## 2024-10-21 PROBLEM — L23.7 ALLERGIC CONTACT DERMATITIS DUE TO PLANTS, EXCEPT FOOD: Status: ACTIVE | Noted: 2024-10-21

## 2024-10-21 PROBLEM — D22.5 MELANOCYTIC NEVI OF TRUNK: Status: ACTIVE | Noted: 2024-10-21

## 2024-10-21 PROBLEM — D18.01 HEMANGIOMA OF SKIN AND SUBCUTANEOUS TISSUE: Status: ACTIVE | Noted: 2024-10-21

## 2024-10-21 PROCEDURE — ? COUNSELING

## 2024-10-21 PROCEDURE — 99213 OFFICE O/P EST LOW 20 MIN: CPT

## 2024-10-21 PROCEDURE — ? PRESCRIPTION MEDICATION MANAGEMENT

## 2024-10-21 PROCEDURE — ? ADDITIONAL NOTES

## 2024-10-21 PROCEDURE — ? FULL BODY SKIN EXAM

## 2024-10-21 ASSESSMENT — LOCATION DETAILED DESCRIPTION DERM
LOCATION DETAILED: LEFT ANTERIOR DISTAL UPPER ARM
LOCATION DETAILED: LEFT INFERIOR LATERAL NECK
LOCATION DETAILED: INFERIOR THORACIC SPINE
LOCATION DETAILED: LEFT AXILLARY VAULT
LOCATION DETAILED: LEFT INFERIOR MEDIAL MALAR CHEEK
LOCATION DETAILED: RIGHT DISTAL PRETIBIAL REGION
LOCATION DETAILED: RIGHT ANTERIOR DISTAL UPPER ARM
LOCATION DETAILED: LEFT DISTAL DORSAL FOREARM
LOCATION DETAILED: EPIGASTRIC SKIN
LOCATION DETAILED: RIGHT INFERIOR LATERAL NECK
LOCATION DETAILED: RIGHT RADIAL DORSAL HAND
LOCATION DETAILED: RIGHT AXILLARY VAULT
LOCATION DETAILED: RIGHT DISTAL DORSAL FOREARM
LOCATION DETAILED: LEFT PROXIMAL PRETIBIAL REGION
LOCATION DETAILED: RIGHT PROXIMAL PRETIBIAL REGION
LOCATION DETAILED: SUPERIOR THORACIC SPINE
LOCATION DETAILED: LEFT RADIAL DORSAL HAND
LOCATION DETAILED: PERIUMBILICAL SKIN
LOCATION DETAILED: RIGHT MEDIAL UPPER BACK

## 2024-10-21 ASSESSMENT — LOCATION ZONE DERM
LOCATION ZONE: ARM
LOCATION ZONE: LEG
LOCATION ZONE: AXILLAE
LOCATION ZONE: FACE
LOCATION ZONE: HAND
LOCATION ZONE: NECK
LOCATION ZONE: TRUNK

## 2024-10-21 ASSESSMENT — PAIN INTENSITY VAS: HOW INTENSE IS YOUR PAIN 0 BEING NO PAIN, 10 BEING THE MOST SEVERE PAIN POSSIBLE?: NO PAIN

## 2024-10-21 ASSESSMENT — LOCATION SIMPLE DESCRIPTION DERM
LOCATION SIMPLE: UPPER BACK
LOCATION SIMPLE: LEFT FOREARM
LOCATION SIMPLE: RIGHT ANTERIOR NECK
LOCATION SIMPLE: RIGHT PRETIBIAL REGION
LOCATION SIMPLE: RIGHT UPPER BACK
LOCATION SIMPLE: LEFT UPPER ARM
LOCATION SIMPLE: LEFT ANTERIOR NECK
LOCATION SIMPLE: LEFT PRETIBIAL REGION
LOCATION SIMPLE: LEFT HAND
LOCATION SIMPLE: RIGHT UPPER ARM
LOCATION SIMPLE: LEFT AXILLARY VAULT
LOCATION SIMPLE: RIGHT HAND
LOCATION SIMPLE: LEFT CHEEK
LOCATION SIMPLE: RIGHT FOREARM
LOCATION SIMPLE: RIGHT AXILLARY VAULT
LOCATION SIMPLE: ABDOMEN

## 2024-10-21 ASSESSMENT — ITCH NUMERIC RATING SCALE: HOW SEVERE IS YOUR ITCHING?: 0

## 2024-10-21 ASSESSMENT — BSA RASH: BSA RASH: 0

## 2024-10-21 ASSESSMENT — SEVERITY ASSESSMENT: SEVERITY: CLEAR

## 2024-10-21 NOTE — PROCEDURE: PRESCRIPTION MEDICATION MANAGEMENT
Discontinue Regimen: prednisone 20 mg tablet Take 3 tabs (60 mg) po in AM for 5 days, then take 2 tabs (40 mg) po in AM for the next 5 days, and finally take 1 tab (20 mg) po in AM for the last 5 days\\n\\nclobetasol 0.05 % topical ointmentApply to affected area bid for  2  weeks then stop
Detail Level: Zone
Render In Strict Bullet Format?: No

## 2024-10-21 NOTE — PROCEDURE: ADDITIONAL NOTES
Render Risk Assessment In Note?: no
Detail Level: Detailed
Additional Notes: Due to position ivy
Additional Notes: Patient was informed of the cosmetic fee for the removal of asymptomatic skin tags

## 2024-10-25 DIAGNOSIS — E66.09 CLASS 2 OBESITY DUE TO EXCESS CALORIES WITH BODY MASS INDEX (BMI) OF 35.0 TO 35.9 IN ADULT, UNSPECIFIED WHETHER SERIOUS COMORBIDITY PRESENT: ICD-10-CM

## 2024-10-25 DIAGNOSIS — E66.812 CLASS 2 OBESITY DUE TO EXCESS CALORIES WITH BODY MASS INDEX (BMI) OF 35.0 TO 35.9 IN ADULT, UNSPECIFIED WHETHER SERIOUS COMORBIDITY PRESENT: ICD-10-CM

## 2024-10-28 RX ORDER — TIRZEPATIDE 7.5 MG/.5ML
7.5 INJECTION, SOLUTION SUBCUTANEOUS
Qty: 2 ML | Refills: 0 | Status: SHIPPED | OUTPATIENT
Start: 2024-10-28 | End: 2024-11-27 | Stop reason: SDUPTHER

## 2024-11-27 DIAGNOSIS — E66.812 CLASS 2 OBESITY DUE TO EXCESS CALORIES WITH BODY MASS INDEX (BMI) OF 35.0 TO 35.9 IN ADULT, UNSPECIFIED WHETHER SERIOUS COMORBIDITY PRESENT: ICD-10-CM

## 2024-11-27 DIAGNOSIS — E66.09 CLASS 2 OBESITY DUE TO EXCESS CALORIES WITH BODY MASS INDEX (BMI) OF 35.0 TO 35.9 IN ADULT, UNSPECIFIED WHETHER SERIOUS COMORBIDITY PRESENT: ICD-10-CM

## 2024-11-27 RX ORDER — TIRZEPATIDE 7.5 MG/.5ML
7.5 INJECTION, SOLUTION SUBCUTANEOUS
Qty: 2 ML | Refills: 0 | Status: SHIPPED | OUTPATIENT
Start: 2024-11-27 | End: 2024-12-17 | Stop reason: SDUPTHER

## 2024-12-17 DIAGNOSIS — E66.812 CLASS 2 OBESITY DUE TO EXCESS CALORIES WITH BODY MASS INDEX (BMI) OF 35.0 TO 35.9 IN ADULT, UNSPECIFIED WHETHER SERIOUS COMORBIDITY PRESENT: ICD-10-CM

## 2024-12-17 DIAGNOSIS — E66.09 CLASS 2 OBESITY DUE TO EXCESS CALORIES WITH BODY MASS INDEX (BMI) OF 35.0 TO 35.9 IN ADULT, UNSPECIFIED WHETHER SERIOUS COMORBIDITY PRESENT: ICD-10-CM

## 2024-12-18 RX ORDER — TIRZEPATIDE 7.5 MG/.5ML
7.5 INJECTION, SOLUTION SUBCUTANEOUS
Qty: 2 ML | Refills: 0 | Status: SHIPPED | OUTPATIENT
Start: 2024-12-18 | End: 2025-01-16 | Stop reason: SDUPTHER

## 2025-01-08 ENCOUNTER — TELEPHONE (OUTPATIENT)
Dept: FAMILY MEDICINE | Facility: CLINIC | Age: 50
End: 2025-01-08
Payer: COMMERCIAL

## 2025-01-08 NOTE — TELEPHONE ENCOUNTER
Confirmation - Referral [id number]  Effective: 01/08/2025     Expires: 04/08/2025  Active  Referred From  [facility]  Specialty: Family Practice  Group NPI: [id number]  Provider ID: [id number]  Tax ID: [id number]  [address]  [place] [address]  Referred To  [facility]  Specialty: Dietician  Tier 2  Group NPI: [id number]  Provider ID: [id number]  Tax ID: [id number]  This referral is valid at any location for the above group  Patient Info  GUANAKITO ARMANDO  [id number]  Female  1975  [address]  [facility] [place] [address]  Clinical Information  Place of Service  Office  Service Type  Medical Care  Diagnosis  1E66.9 - obesity, unspecified  Procedures  809615 - medical nutrition therapy; re-assessment and intervention, individual, face-to- face with the patient, each 15 minutes  082036 - unlisted evaluation and management service  Disclaimer  [employer] and its Affiliates ([facility]) will pay for only those services covered under the [facility] Contract which are specifically noted and requested by the PCP or OBGYN on the referral. If any additional services, testing, or follow-up care are required, the PCP or OBGYN must be contacted prior to the delivery of such additional services for written approval on a separate referral. Non-referred services will not be covered by [facility]. Benefits are underwritten or administered by [employer], a subsidiary of [employer], which are independent licensees of the [employer] and [employer].  Overview  Terms & Conditions  PEAR Applications  [employer] Home  Practice Management  Plan News  [facility] Administrators  Critical access hospital Administrators  [employer]  Help & Support  Help Center  Contact Us  © 2025 [employer]. All Rights Reserved

## 2025-01-16 DIAGNOSIS — E66.812 CLASS 2 OBESITY DUE TO EXCESS CALORIES WITH BODY MASS INDEX (BMI) OF 35.0 TO 35.9 IN ADULT, UNSPECIFIED WHETHER SERIOUS COMORBIDITY PRESENT: ICD-10-CM

## 2025-01-16 DIAGNOSIS — E66.09 CLASS 2 OBESITY DUE TO EXCESS CALORIES WITH BODY MASS INDEX (BMI) OF 35.0 TO 35.9 IN ADULT, UNSPECIFIED WHETHER SERIOUS COMORBIDITY PRESENT: ICD-10-CM

## 2025-01-16 RX ORDER — TIRZEPATIDE 7.5 MG/.5ML
7.5 INJECTION, SOLUTION SUBCUTANEOUS
Qty: 2 ML | Refills: 0 | Status: SHIPPED | OUTPATIENT
Start: 2025-01-16 | End: 2025-02-11 | Stop reason: SDUPTHER

## 2025-01-28 ENCOUNTER — OFFICE VISIT (OUTPATIENT)
Dept: FAMILY MEDICINE | Facility: CLINIC | Age: 50
End: 2025-01-28
Payer: COMMERCIAL

## 2025-01-28 VITALS
OXYGEN SATURATION: 100 % | SYSTOLIC BLOOD PRESSURE: 120 MMHG | HEIGHT: 68 IN | BODY MASS INDEX: 30.8 KG/M2 | WEIGHT: 203.2 LBS | DIASTOLIC BLOOD PRESSURE: 84 MMHG | TEMPERATURE: 97.8 F | HEART RATE: 82 BPM

## 2025-01-28 DIAGNOSIS — M25.512 ACUTE PAIN OF LEFT SHOULDER: Primary | ICD-10-CM

## 2025-01-28 PROBLEM — J02.9 ACUTE PHARYNGITIS, UNSPECIFIED: Status: ACTIVE | Noted: 2025-01-28

## 2025-01-28 PROBLEM — R09.82 POSTNASAL DRIP: Status: ACTIVE | Noted: 2025-01-28

## 2025-01-28 PROBLEM — M79.672 PAIN IN LEFT FOOT: Status: ACTIVE | Noted: 2025-01-28

## 2025-01-28 PROCEDURE — 99213 OFFICE O/P EST LOW 20 MIN: CPT

## 2025-01-28 PROCEDURE — 3008F BODY MASS INDEX DOCD: CPT

## 2025-01-28 ASSESSMENT — ENCOUNTER SYMPTOMS
JOINT SWELLING: 0
CHILLS: 0
SHORTNESS OF BREATH: 0
FEVER: 0

## 2025-01-28 NOTE — PATIENT INSTRUCTIONS
You may take tylenol or ibuprofen as needed    Schedule with Dr. Marcelle Perez - sports medicine

## 2025-01-28 NOTE — PROGRESS NOTES
"   [facility]      Reason for visit:   Chief Complaint   Patient presents with    Illness      Sheba Bernal is a 49 y.o. female who presents for     She reports left shoulder pain starting a few weeks ago. Outside of shoulder.  Can't lift arm past 90 degrees due to pain. Difficulty with lifting heavy objects.   Slight numbness in to left elbow if resting in arm chair.    She is left handed.   No injury.     Years ago had shoulder strain from lifting weights but resolved.               History reviewed. No pertinent past medical history.    Past Surgical History   Procedure Laterality Date     section      x 2       Social History     Tobacco Use    Smoking status: Never    Smokeless tobacco: Never   Vaping Use    Vaping status: Never Used   Substance Use Topics    Alcohol use: Never    Drug use: Never       Family History   Problem Relation Name Age of Onset    Hypertension Biological Mother      Diabetes Biological Father      Heart attack Maternal Grandfather  53    Parkinsonism Paternal Grandfather         Patient has no known allergies.    Medications Ordered Prior to Encounter[1]    Review of Systems   Constitutional:  Negative for chills and fever.   Respiratory:  Negative for shortness of breath.    Cardiovascular:  Negative for chest pain.   Musculoskeletal:  Negative for joint swelling.        Left shoulder pain       Objective   Vitals:    25 1517   BP: 120/84   BP Location: Right upper arm   Patient Position: Sitting   Pulse: 82   Temp: 36.6 °C (97.8 °F)   TempSrc: Temporal   SpO2: 100%   Weight: 92.2 kg (203 lb 3.2 oz)   Height: 1.727 m (5' 8\")     Body mass index is 30.9 kg/m².    Physical Exam  Constitutional:       General: She is not in acute distress.     Appearance: Normal appearance. She is not ill-appearing.   Pulmonary:      Effort: Pulmonary effort is normal.   Musculoskeletal:      Left shoulder: No swelling, deformity, tenderness or bony tenderness. Decreased range of motion. Normal " strength.      Comments: Limited ROM due to increased pain with abduction, empty can test and apley scratch test    Neurological:      Mental Status: She is alert and oriented to person, place, and time.         Procedures    Lab Results   Component Value Date    WBC 5.7 06/27/2024    HGB 11.9 06/27/2024    HCT 36.8 06/27/2024     06/27/2024    CHOL 151 06/27/2024    TRIG 88 06/27/2024    HDL 35 (L) 06/27/2024    ALT 9 06/27/2024    AST 13 06/27/2024     06/27/2024    K 4.5 06/27/2024     06/27/2024    CREATININE 0.91 06/27/2024    BUN 11 06/27/2024    CO2 21 06/27/2024    TSH 3.350 06/27/2024           Assessment   Problem List Items Addressed This Visit    None  Visit Diagnoses       Acute pain of left shoulder    -  Primary  Check XR  Tylenol/ibuprofen prn   Recommend see sports med Dr Hennessy    Relevant Orders    X-RAY SHOULDER LEFT 2+ VIEWS                PALLAVI Matthew  1/28/2025          [1]   Current Outpatient Medications on File Prior to Visit   Medication Sig Dispense Refill    estradioL (CLIMARA) 0.05 mg/24 hr Place 1 patch on the skin.      tirzepatide, weight loss, (ZEPBOUND) 7.5 mg/0.5 mL subcutaneous PEN injector Inject 0.5 mL (7.5 mg total) under the skin every (seven) 7 days. 2 mL 0    betamethasone valerate (VALISONE) 0.1 % cream APPLY TOPICALLY 2 (TWO) TIMES A DAY. 45 g 0     No current facility-administered medications on file prior to visit.

## 2025-02-11 DIAGNOSIS — E66.09 CLASS 2 OBESITY DUE TO EXCESS CALORIES WITH BODY MASS INDEX (BMI) OF 35.0 TO 35.9 IN ADULT, UNSPECIFIED WHETHER SERIOUS COMORBIDITY PRESENT: ICD-10-CM

## 2025-02-11 DIAGNOSIS — E66.812 CLASS 2 OBESITY DUE TO EXCESS CALORIES WITH BODY MASS INDEX (BMI) OF 35.0 TO 35.9 IN ADULT, UNSPECIFIED WHETHER SERIOUS COMORBIDITY PRESENT: ICD-10-CM

## 2025-02-11 RX ORDER — TIRZEPATIDE 7.5 MG/.5ML
7.5 INJECTION, SOLUTION SUBCUTANEOUS
Qty: 2 ML | Refills: 0 | Status: SHIPPED | OUTPATIENT
Start: 2025-02-11 | End: 2025-03-13 | Stop reason: DRUGHIGH

## 2025-03-12 DIAGNOSIS — E66.812 CLASS 2 OBESITY DUE TO EXCESS CALORIES WITH BODY MASS INDEX (BMI) OF 35.0 TO 35.9 IN ADULT, UNSPECIFIED WHETHER SERIOUS COMORBIDITY PRESENT: ICD-10-CM

## 2025-03-12 DIAGNOSIS — E66.09 CLASS 2 OBESITY DUE TO EXCESS CALORIES WITH BODY MASS INDEX (BMI) OF 35.0 TO 35.9 IN ADULT, UNSPECIFIED WHETHER SERIOUS COMORBIDITY PRESENT: ICD-10-CM

## 2025-03-12 RX ORDER — TIRZEPATIDE 7.5 MG/.5ML
7.5 INJECTION, SOLUTION SUBCUTANEOUS
Qty: 2 ML | Refills: 0 | Status: CANCELLED | OUTPATIENT
Start: 2025-03-12

## 2025-03-13 RX ORDER — TIRZEPATIDE 10 MG/.5ML
10 INJECTION, SOLUTION SUBCUTANEOUS
Qty: 2 ML | Refills: 0 | Status: SHIPPED | OUTPATIENT
Start: 2025-03-13 | End: 2025-04-09 | Stop reason: SDUPTHER

## 2025-04-09 RX ORDER — TIRZEPATIDE 10 MG/.5ML
10 INJECTION, SOLUTION SUBCUTANEOUS
Qty: 2 ML | Refills: 0 | Status: SHIPPED | OUTPATIENT
Start: 2025-04-09 | End: 2025-05-13 | Stop reason: SDUPTHER

## 2025-05-12 ENCOUNTER — PATIENT MESSAGE (OUTPATIENT)
Dept: FAMILY MEDICINE | Facility: CLINIC | Age: 50
End: 2025-05-12
Payer: COMMERCIAL

## 2025-05-12 DIAGNOSIS — E66.09 CLASS 2 OBESITY DUE TO EXCESS CALORIES WITH BODY MASS INDEX (BMI) OF 35.0 TO 35.9 IN ADULT, UNSPECIFIED WHETHER SERIOUS COMORBIDITY PRESENT: Primary | ICD-10-CM

## 2025-05-12 DIAGNOSIS — E66.812 CLASS 2 OBESITY DUE TO EXCESS CALORIES WITH BODY MASS INDEX (BMI) OF 35.0 TO 35.9 IN ADULT, UNSPECIFIED WHETHER SERIOUS COMORBIDITY PRESENT: Primary | ICD-10-CM

## 2025-05-13 RX ORDER — TIRZEPATIDE 10 MG/.5ML
10 INJECTION, SOLUTION SUBCUTANEOUS
Qty: 2 ML | Refills: 0 | Status: SHIPPED | OUTPATIENT
Start: 2025-05-13

## 2025-05-20 ENCOUNTER — HOSPITAL ENCOUNTER (OUTPATIENT)
Dept: RADIOLOGY | Facility: CLINIC | Age: 50
Discharge: HOME | End: 2025-05-20
Payer: COMMERCIAL

## 2025-05-20 DIAGNOSIS — M25.512 ACUTE PAIN OF LEFT SHOULDER: ICD-10-CM

## 2025-05-20 PROCEDURE — 73030 X-RAY EXAM OF SHOULDER: CPT | Mod: LT

## 2025-05-22 ENCOUNTER — RESULTS FOLLOW-UP (OUTPATIENT)
Dept: FAMILY MEDICINE | Facility: CLINIC | Age: 50
End: 2025-05-22

## 2025-06-06 DIAGNOSIS — E66.09 CLASS 2 OBESITY DUE TO EXCESS CALORIES WITH BODY MASS INDEX (BMI) OF 35.0 TO 35.9 IN ADULT, UNSPECIFIED WHETHER SERIOUS COMORBIDITY PRESENT: ICD-10-CM

## 2025-06-06 DIAGNOSIS — E66.812 CLASS 2 OBESITY DUE TO EXCESS CALORIES WITH BODY MASS INDEX (BMI) OF 35.0 TO 35.9 IN ADULT, UNSPECIFIED WHETHER SERIOUS COMORBIDITY PRESENT: ICD-10-CM

## 2025-06-06 RX ORDER — TIRZEPATIDE 10 MG/.5ML
10 INJECTION, SOLUTION SUBCUTANEOUS
Qty: 6 ML | Refills: 0 | Status: SHIPPED | OUTPATIENT
Start: 2025-06-06

## 2025-06-09 ENCOUNTER — NURSE TRIAGE (OUTPATIENT)
Dept: FAMILY MEDICINE | Facility: CLINIC | Age: 50
End: 2025-06-09
Payer: COMMERCIAL

## 2025-06-09 ENCOUNTER — TELEMEDICINE (OUTPATIENT)
Dept: FAMILY MEDICINE | Facility: CLINIC | Age: 50
End: 2025-06-09
Payer: COMMERCIAL

## 2025-06-09 DIAGNOSIS — S61.259A INFECTED DOG BITE OF HAND INCLUDING FINGERS, RIGHT, INITIAL ENCOUNTER: Primary | ICD-10-CM
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
## 2025-06-09 DIAGNOSIS — S61.451A INFECTED DOG BITE OF HAND INCLUDING FINGERS, RIGHT, INITIAL ENCOUNTER: Primary | ICD-10-CM

## 2025-06-09 DIAGNOSIS — L08.9 INFECTED DOG BITE OF HAND INCLUDING FINGERS, RIGHT, INITIAL ENCOUNTER: Primary | ICD-10-CM

## 2025-06-09 PROCEDURE — 99214 OFFICE O/P EST MOD 30 MIN: CPT | Mod: 95 | Performed by: NURSE PRACTITIONER

## 2025-06-09 RX ORDER — AMOXICILLIN AND CLAVULANATE POTASSIUM 875; 125 MG/1; MG/1
1 TABLET, FILM COATED ORAL 2 TIMES DAILY
Qty: 20 TABLET | Refills: 0 | Status: SHIPPED | OUTPATIENT
Start: 2025-06-09 | End: 2025-06-19

## 2025-06-09 ASSESSMENT — ENCOUNTER SYMPTOMS
FEVER: 0
ARTHRALGIAS: 0
JOINT SWELLING: 1
COLOR CHANGE: 1
DIAPHORESIS: 0
ACTIVITY CHANGE: 0
NUMBNESS: 0
FATIGUE: 0
CHILLS: 0
APPETITE CHANGE: 0
WEAKNESS: 0
WOUND: 1

## 2025-06-09 NOTE — TELEPHONE ENCOUNTER
"Patient transferred to the Primary Care Telephonic Nurse Triage Line with complaints of dog bite    HPI:  Middle finger of R hand - bite from dog   Open wound - small / tiny area  Swelling & redness   Fully vaccinated dog   Tdap 2023     Disposition:  See Telemedicine Appointment Today (overriding Go to Office Now)- no office availability - scheduled telemed     Care Advice:  Care Advice Given    Patient/Caregiver understands and will follow care advice?: Yes, plans to follow advice      Given By Given At Lexa Lyle 6/9/2025 12:33 PM Yes           SEE YOU ON VIDEO VISIT               Initial Assessment:  1. APPEARANCE \"What does it look like?\"       Tiny wound, red swollen   2. SIZE: \"How big is the bite?\"       tiny  3. LOCATION: \"Where is the bite located?\"       Middle finger   4. ONSET: \"When did the bite happen?\"       Over weekend   5. ANIMAL: \"What type of animal caused the bite?\"       My dog  6. RABIES VACCINE: \"Do you know if the pet is vaccinated against rabies?\"        Fully vaccinated   7. CIRCUMSTANCES: \"Tell me how this happened.\"       Dog bite  8. TETANUS: \"When was your last tetanus booster?\"      2023      "

## 2025-06-09 NOTE — PROGRESS NOTES
Verification of Patient Location:  The patient affirms they are currently located in the following state: [place]    Are you in your home or a private residence? No    Request for Consent:    Audio and Video Encounter   Samantha, my name is Dang PALLAVI Lopes.  Before we proceed, can you please verify your identification by telling me your full name and date of birth?  Can you tell me who is in the room with you?    You and I are about to have a telemedicine check-in or visit because you have requested it.  This is a live video-conference.  I am a real person, speaking to you in real time.  There is no one else with me on the video-conference. I am not recording this conversation and I am asking you not to record it.  This telemedicine visit will be billed to your health insurance or you, if you are self-insured.  You understand you will be responsible for any copayments or coinsurances that apply to your telemedicine visit.  Communication platform used for this encounter:  [employer] Video Visit (Epic Video Client)       Before starting our telemedicine visit, I am required to get your consent for this virtual check-in or visit by telemedicine. Do you consent?    Patient Response to Request for Consent:  Yes      Visit Documentation:  Subjective     Patient ID: Sheba Bernal is a 49 y.o. female.  1975      Sheba Bernal joins video visit virtually as a new patient to me.     Last night had an accidental bite from her dog  Was moving food for her other dog  Cleaned it yesterday and used a band aid but today getting worse  Middle finger of her right hand - she is left handed  Dogs shots are up to date  [id number]  [address] [place] [address] [place] [address] [phone]          The following have been reviewed and updated as appropriate in this visit:   Tobacco  Allergies  Meds  Problems  Med Hx  Surg Hx  Fam Hx       Review of Systems   Constitutional:   Negative for activity change, appetite change, chills, diaphoresis, fatigue and fever.   Cardiovascular:  Negative for leg swelling.   Musculoskeletal:  Positive for joint swelling. Negative for arthralgias.   Skin:  Positive for color change and wound. Negative for rash.   Neurological:  Negative for weakness and numbness.         Assessment & Plan  Infected dog bite of hand including fingers, right, initial encounter  Use medication as directed  Continue supportive care  Follow up if not improving or feeling worse  ER precautions reviewed  LMOM for [facility] dept             Time Spent:  I spent 21 minutes on this date of service performing the following activities: obtaining history, performing examination, entering orders, documenting, preparing for visit, obtaining / reviewing records, providing counseling and education, and coordinating care.

## 2025-06-09 NOTE — ASSESSMENT & PLAN NOTE
Use medication as directed  Continue supportive care  Follow up if not improving or feeling worse  ER precautions reviewed  LMOM for [facility] dept

## 2025-06-09 NOTE — PATIENT INSTRUCTIONS
Please start the antibiotics  Take them with food in your stomach  You can use a probiotic daily while on the antibiotics and for 2 weeks after  Please follow up if you are not improving over the next 24-48 hours  Please go to the ER for worsening symptoms after starting the antibiotics

## 2025-06-09 NOTE — TELEPHONE ENCOUNTER
Regarding: red flag  ----- Message from Kamilla LUCERO sent at 6/9/2025 12:25 PM EDT -----  Patient called today with red flag complaint of her dog bit her right middle finger yesterday.    INSURANCE/RTE Concern? no    Has the caller been verified as an authorized person to receive PHI? N\A  Method of Confirmation? N/a    Call transferred to Primary Care Nurse Triage Line.

## 2025-06-27 PROBLEM — L08.9: Status: RESOLVED | Noted: 2025-06-09 | Resolved: 2025-06-27

## 2025-06-27 PROBLEM — M79.672 PAIN IN LEFT FOOT: Status: RESOLVED | Noted: 2025-01-28 | Resolved: 2025-06-27
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
## 2025-06-27 PROBLEM — M79.89 LEG SWELLING: Status: RESOLVED | Noted: 2024-06-25 | Resolved: 2025-06-27

## 2025-06-27 PROBLEM — J02.9 ACUTE PHARYNGITIS, UNSPECIFIED: Status: RESOLVED | Noted: 2025-01-28 | Resolved: 2025-06-27

## 2025-06-27 PROBLEM — S61.451A: Status: RESOLVED | Noted: 2025-06-09 | Resolved: 2025-06-27

## 2025-06-27 PROBLEM — L23.7 POISON IVY DERMATITIS: Status: RESOLVED | Noted: 2023-06-30 | Resolved: 2025-06-27

## 2025-06-27 PROBLEM — R09.82 POSTNASAL DRIP: Status: RESOLVED | Noted: 2025-01-28 | Resolved: 2025-06-27

## 2025-06-27 PROBLEM — S61.259A: Status: RESOLVED | Noted: 2025-06-09 | Resolved: 2025-06-27

## 2025-06-30 ENCOUNTER — OFFICE VISIT (OUTPATIENT)
Dept: FAMILY MEDICINE | Facility: CLINIC | Age: 50
End: 2025-06-30
Payer: COMMERCIAL

## 2025-06-30 VITALS
WEIGHT: 174 LBS | DIASTOLIC BLOOD PRESSURE: 82 MMHG | SYSTOLIC BLOOD PRESSURE: 124 MMHG | OXYGEN SATURATION: 99 % | HEART RATE: 81 BPM | TEMPERATURE: 98 F | HEIGHT: 68 IN | BODY MASS INDEX: 26.37 KG/M2

## 2025-06-30 DIAGNOSIS — E66.09 CLASS 2 OBESITY DUE TO EXCESS CALORIES WITHOUT SERIOUS COMORBIDITY WITH BODY MASS INDEX (BMI) OF 35.0 TO 35.9 IN ADULT: ICD-10-CM

## 2025-06-30 DIAGNOSIS — Z12.31 BREAST CANCER SCREENING BY MAMMOGRAM: ICD-10-CM

## 2025-06-30 DIAGNOSIS — Z23 NEED FOR VACCINATION: Primary | ICD-10-CM

## 2025-06-30 DIAGNOSIS — R42 LIGHTHEADEDNESS: ICD-10-CM

## 2025-06-30 DIAGNOSIS — Z00.00 ROUTINE HEALTH MAINTENANCE: Primary | ICD-10-CM

## 2025-06-30 DIAGNOSIS — N39.46 URGE AND STRESS INCONTINENCE: ICD-10-CM

## 2025-06-30 DIAGNOSIS — E66.812 CLASS 2 OBESITY DUE TO EXCESS CALORIES WITHOUT SERIOUS COMORBIDITY WITH BODY MASS INDEX (BMI) OF 35.0 TO 35.9 IN ADULT: ICD-10-CM

## 2025-06-30 DIAGNOSIS — Z23 NEED FOR PNEUMOCOCCAL VACCINE: ICD-10-CM

## 2025-06-30 PROCEDURE — 90677 PCV20 VACCINE IM: CPT | Performed by: FAMILY MEDICINE

## 2025-06-30 PROCEDURE — 99396 PREV VISIT EST AGE 40-64: CPT | Mod: 25 | Performed by: FAMILY MEDICINE

## 2025-06-30 PROCEDURE — 3008F BODY MASS INDEX DOCD: CPT | Performed by: FAMILY MEDICINE

## 2025-06-30 PROCEDURE — 90471 IMMUNIZATION ADMIN: CPT | Performed by: FAMILY MEDICINE

## 2025-06-30 ASSESSMENT — ENCOUNTER SYMPTOMS
HEMATURIA: 0
FEVER: 0
VOMITING: 0
COLOR CHANGE: 0
RHINORRHEA: 0
CONSTIPATION: 1
ABDOMINAL PAIN: 0
ARTHRALGIAS: 1
MYALGIAS: 0
SHORTNESS OF BREATH: 0
UNEXPECTED WEIGHT CHANGE: 0
PHOTOPHOBIA: 0
BRUISES/BLEEDS EASILY: 0
DIZZINESS: 1
HEADACHES: 0
COUGH: 0
ADENOPATHY: 0
PALPITATIONS: 0
CHILLS: 0
BLOOD IN STOOL: 0
TROUBLE SWALLOWING: 0
DIARRHEA: 0
LIGHT-HEADEDNESS: 1
NAUSEA: 0
DYSURIA: 0

## 2025-06-30 ASSESSMENT — PATIENT HEALTH QUESTIONNAIRE - PHQ9: SUM OF ALL RESPONSES TO PHQ9 QUESTIONS 1 & 2: 0

## 2025-06-30 NOTE — PROGRESS NOTES
No chief complaint on file.       HPI: Sheba Bernal is a 49 y.o. year old female who presents today for annual physical and for bladder issues.    1 year of urinary urgency. Difficulty getting pants down fast enough.   She had two c sections.  Drinks 1 cup of tea per day.  Does drink a lot of water.  Some incontinence with coughing and sneezing.   Intermittent dryness issues.  Still intermittent getting periods.      Dizziness when getting out of bed or out of the car. Lightheaded and feeling like she is going to black out.  Happens daily.  No SOB.  No unexpected energy levels.  She is on zepbound.  Breakfast - fairlife meal replacement shake; banana  Lunch - salami and cheese; ham sandwich  Dinner - meatloaf mashed potatoes greenbeans.    She did have a fall in April.     Cancer screening:  Pap: up to date  Mammo: up to date  Colonoscopy: up to date     Immunizations: Due for shingrix and prevnar  Dental exam: UTD  Eye exam: UTD  DEXA: n/a     Exercise: walking daily  Diet: Healthy, as above.          Patient Active Problem List   Diagnosis    Poison ivy dermatitis    Class 2 obesity due to excess calories with body mass index (BMI) of 35.0 to 35.9 in adult    Leg swelling    Abnormality of forces of labor    Abnormality of organs and soft tissues of pelvis with delivery    Encounter for contraceptive management    Postnasal drip    Pain in left foot    Acute pharyngitis, unspecified    Infected dog bite of hand including fingers, right, initial encounter        No past medical history on file.     Past Surgical History   Procedure Laterality Date     section      x 2        Family History   Problem Relation Name Age of Onset    Hypertension Biological Mother      Diabetes Biological Father      Heart attack Maternal Grandfather  53    Parkinsonism Paternal Grandfather          Social History     Socioeconomic History    Marital status:      Spouse name: Not on file    Number of children: Not on file     Years of education: Not on file    Highest education level: Not on file   Occupational History    Not on file   Tobacco Use    Smoking status: Never    Smokeless tobacco: Never   Vaping Use    Vaping status: Never Used   Substance and Sexual Activity    Alcohol use: Never    Drug use: Never    Sexual activity: Not on file   Other Topics Concern    Not on file   Social History Narrative    Do you wear your seatbelt? Yes    Do you have smoke detector in your home? Yes    Do you have a carbon monoxide detector in your home? 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      Social Drivers of Health     Financial Resource Strain: Not on file   Food Insecurity: Not on file   Transportation Needs: Not on file   Physical Activity: Not on file   Stress: Not on file   Social Connections: Not on file   Intimate Partner Violence: Not on file   Housing Stability: Not on file        Current Outpatient Medications on File Prior to Visit   Medication Sig    betamethasone valerate (VALISONE) 0.1 % cream APPLY TOPICALLY 2 (TWO) TIMES A DAY.    estradioL (CLIMARA) 0.05 mg/24 hr Place 1 patch on the skin.    tirzepatide, weight loss, (ZEPBOUND) 10 mg/0.5 mL subcutaneous PEN injector Inject 0.5 mL (10 mg total) under the skin every (seven) 7 days.     No current facility-administered medications on file prior to visit.        No Known Allergies    Review of Systems   Constitutional:  Negative for chills, fever and unexpected weight change.   HENT:  Negative for congestion, hearing loss, rhinorrhea and trouble swallowing.    Eyes:  Negative for photophobia and visual disturbance.   Respiratory:  Negative for cough and shortness of breath.    Cardiovascular:  Negative for chest pain and palpitations.   Gastrointestinal:  Positive for constipation. Negative for abdominal pain, blood in stool, diarrhea, nausea and vomiting.   Endocrine: Positive for cold intolerance (chronic). Negative for heat intolerance.  "  Genitourinary:  Negative for dysuria and hematuria.        See HPI   Musculoskeletal:  Positive for arthralgias. Negative for myalgias.   Skin:  Negative for color change and rash.   Allergic/Immunologic: Negative for environmental allergies and food allergies.   Neurological:  Positive for dizziness and light-headedness. Negative for syncope and headaches.   Hematological:  Negative for adenopathy. Does not bruise/bleed easily.   Psychiatric/Behavioral:          Trying to cope with 's death       Visit Vitals  /82 (BP Location: Left upper arm, Patient Position: Sitting)   Pulse 81   Temp 36.7 °C (98 °F) (Temporal)   Ht 1.727 m (5' 8\")   Wt 78.9 kg (174 lb)   SpO2 99%   BMI 26.46 kg/m²     Physical Exam  Vitals reviewed.   Constitutional:       General: She is not in acute distress.     Appearance: She is obese. She is not ill-appearing.   HENT:      Head: Normocephalic and atraumatic.      Right Ear: Tympanic membrane normal.      Left Ear: Tympanic membrane normal.      Nose: Nose normal. No congestion or rhinorrhea.      Mouth/Throat:      Mouth: Mucous membranes are moist.      Pharynx: No oropharyngeal exudate or posterior oropharyngeal erythema.   Eyes:      General:         Right eye: No discharge.         Left eye: No discharge.      Extraocular Movements: Extraocular movements intact.      Conjunctiva/sclera: Conjunctivae normal.   Cardiovascular:      Rate and Rhythm: Normal rate and regular rhythm.      Heart sounds: No murmur heard.  Pulmonary:      Effort: Pulmonary effort is normal.      Breath sounds: Normal breath sounds. No wheezing or rales.   Abdominal:      General: Abdomen is flat. Bowel sounds are normal. There is no distension.      Palpations: Abdomen is soft.      Tenderness: There is no abdominal tenderness.   Musculoskeletal:         General: No swelling or deformity.      Cervical back: Normal range of motion and neck supple.   Skin:     General: Skin is warm and dry.      " Capillary Refill: Capillary refill takes less than 2 seconds.   Neurological:      General: No focal deficit present.      Mental Status: She is alert and oriented to person, place, and time.           Labs/Studies:    Collected Updated Procedure    06/27/2024 0707 06/28/2024 0310 TSH w reflex FT4 [[id number]]    Blood, Venous    Component Value Units   TSH 3.350 uIU/mL   T4,Free(Direct) 1.19 ng/dL          06/27/2024 0707 06/28/2024 0011 Lipid panel [[id number]]    (Abnormal)   Blood, Venous    Component Value Units   Cholesterol, Total 151 mg/dL   Triglycerides 88 mg/dL   HDL Cholesterol 35 Low  mg/dL   VLDL Cholesterol Amos 17 mg/dL   LDL Cholesterol Calc 99 mg/dL          06/27/2024 0707 06/28/2024 0011 Comprehensive metabolic panel [[id number]]    Blood, Venous    Component Value Units   Glucose, Serum 98 mg/dL   BUN 11 mg/dL   Creatinine, Serum 0.91 mg/dL   eGFR 77 mL/min/1.73   BUN/Creatinine Ratio 12    Sodium, Serum 140 mmol/L   Potassium, Serum 4.5 mmol/L   Chloride, Serum 105 mmol/L   Carbon Dioxide, Total 21 mmol/L   Calcium, Serum 9.2 mg/dL   Protein, Total, Serum 6.4 g/dL   Albumin, Serum 4.1 g/dL   Globulin, Total 2.3 g/dL   Bilirubin, Total 0.4 mg/dL   Alkaline Phosphatase, S 61 IU/L   AST (SGOT) 13 IU/L   ALT (SGPT) 9 IU/L          06/27/2024 0707 06/27/2024 2205 CBC and Differential [[id number]]    Blood, Venous    Component Value Units   WBC 5.7 x10E3/uL   RBC 4.19 x10E6/uL   Hemoglobin 11.9 g/dL   Hematocrit 36.8 %   MCV 88 fL   MCH 28.4 pg   MCHC 32.3 g/dL   RDW 13.3 %   Platelets 272 x10E3/uL   Neutrophils 53 %   Lymphs 28 %   Monocytes 14 %   Eos 4 %   Basos 1 %   Neutrophils (Absolute) 3.1 x10E3/uL   Lymphs (Absolute) 1.6 x10E3/uL   Monocytes(Absolute) 0.8 x10E3/uL   Eos (Absolute) 0.2 x10E3/uL   Baso (Absolute) 0.1 x10E3/uL   Immature Granulocytes 0 %   Immature Grans (Abs) 0.0 x10E3/uL           Assessment / Plan       Assessment & Plan  Routine health maintenance  Routine labs -  ordered  Vaccines - prevnar today. She will get shingrix at another time depending on schedule.  Cancer screening - UTD. Mammo nearly due. Ordered.  Routine dental, derm, eye exams recommended.    Age appropriate preventive guidelines reviewed.  Continue healthy diet/exercise patterns.      Orders:    CBC and Differential; Future    Comprehensive metabolic panel; Future    Lipid panel; Future    TSH w reflex FT4; Future    Urge and stress incontinence  Start PFPT.  F/u with gyn to discuss vaginal estrogen.  If not improving with these measures, to see urogyn.    Orders:    Ambulatory referral to Physical Therapy; Future    Class 2 obesity due to excess calories without serious comorbidity with body mass index (BMI) of 35.0 to 35.9 in adult  Doing very well with zepbound.  Continue.  Reviewed appropriate hydration.  Continue walking program.         Lightheadedness  Check labs to r/o anemia, electrolyte disturbance.  Rec adding electrolyte suppl once daily.  Alert me to new symptoms.         Breast cancer screening by mammogram    Orders:    BI SCREENING MAMMOGRAM BILATERAL(TOMOSYNTHESIS); Future    Need for pneumococcal vaccine    Orders:    Pneumococcal conjugate vaccine 20-valent IM        Resources provided for therapist.    F/u 1 year    Marcelle Hennessy MD

## 2025-06-30 NOTE — ASSESSMENT & PLAN NOTE
Check labs to r/o anemia, electrolyte disturbance.  Rec adding electrolyte suppl once daily.  Alert me to new symptoms.

## 2025-06-30 NOTE — PATIENT INSTRUCTIONS
"[email or web address]  You can filter by area, insurance, and other preferences (gender, area of specialization).   I recommend finding 3 that look promising, reach out to see if they are taking new patients.   Rinse and repeat if no luck with the first few.  Likely in-person and telehealth availability (therapist dependent).  [facility]  This is an online platform that links therapists and clients for telehealth therapy.  You can similarly filter by insurance and other preferences.   You would input your insurance information right into the website and it will be processed through your insurance. I would recommend that if you schedule with someone, you check with your insurance to find out your \"out of pocket\" responsibility before your first appointment.  Telehealth only      Local therapists who have been recommended by other patients, but do not take insurace:    [facility] Felipe  - Brief Phone Consult for free to assess needs  - $170 per session  - Provides documentation to submit to insurance for out-of-network reimbursement    [email or web address]  [phone]    -------    Verito Miguel  [phone]  [facility]  [address], [address]  [place] [address]    "

## 2025-06-30 NOTE — ASSESSMENT & PLAN NOTE
Doing very well with zepbound.  Continue.  Reviewed appropriate hydration.  Continue walking program.

## 2025-06-30 NOTE — ASSESSMENT & PLAN NOTE
Start PFPT.  F/u with gyn to discuss vaginal estrogen.  If not improving with these measures, to see urogyn.    Orders:    Ambulatory referral to Physical Therapy; Future

## 2025-07-03 ENCOUNTER — CLINICAL SUPPORT (OUTPATIENT)
Dept: FAMILY MEDICINE | Facility: CLINIC | Age: 50
End: 2025-07-03
Payer: COMMERCIAL

## 2025-07-03 PROCEDURE — 90750 HZV VACC RECOMBINANT IM: CPT | Performed by: FAMILY MEDICINE

## 2025-07-03 PROCEDURE — 90471 IMMUNIZATION ADMIN: CPT | Performed by: FAMILY MEDICINE

## 2025-07-11 ENCOUNTER — HOSPITAL ENCOUNTER (OUTPATIENT)
Dept: RADIOLOGY | Facility: CLINIC | Age: 50
Discharge: HOME | End: 2025-07-11
Attending: FAMILY MEDICINE
Payer: COMMERCIAL

## 2025-07-11 DIAGNOSIS — Z12.31 BREAST CANCER SCREENING BY MAMMOGRAM: ICD-10-CM

## 2025-07-11 PROCEDURE — 77063 BREAST TOMOSYNTHESIS BI: CPT

## 2025-07-12 LAB
ALBUMIN SERPL-MCNC: 4.3 G/DL (ref 3.9–4.9)
ALP SERPL-CCNC: 47 IU/L (ref 44–121)
ALT SERPL-CCNC: 16 IU/L (ref 0–32)
AST SERPL-CCNC: 18 IU/L (ref 0–40)
BASOPHILS # BLD AUTO: 0.1 X10E3/UL (ref 0–0.2)
BASOPHILS NFR BLD AUTO: 1 %
BILIRUB SERPL-MCNC: 0.3 MG/DL (ref 0–1.2)
BUN SERPL-MCNC: 13 MG/DL (ref 6–24)
BUN/CREAT SERPL: 15 (ref 9–23)
CALCIUM SERPL-MCNC: 9.7 MG/DL (ref 8.7–10.2)
CHLORIDE SERPL-SCNC: 103 MMOL/L (ref 96–106)
CHOLEST SERPL-MCNC: 143 MG/DL (ref 100–199)
CO2 SERPL-SCNC: 20 MMOL/L (ref 20–29)
CREAT SERPL-MCNC: 0.89 MG/DL (ref 0.57–1)
EGFRCR SERPLBLD CKD-EPI 2021: 79 ML/MIN/1.73
EOSINOPHIL # BLD AUTO: 0.3 X10E3/UL (ref 0–0.4)
EOSINOPHIL NFR BLD AUTO: 4 %
ERYTHROCYTE [DISTWIDTH] IN BLOOD BY AUTOMATED COUNT: 14 % (ref 11.7–15.4)
GLOBULIN SER CALC-MCNC: 2.7 G/DL (ref 1.5–4.5)
GLUCOSE SERPL-MCNC: 83 MG/DL (ref 70–99)
HCT VFR BLD AUTO: 40.3 % (ref 34–46.6)
HDLC SERPL-MCNC: 39 MG/DL
HGB BLD-MCNC: 12.5 G/DL (ref 11.1–15.9)
IMM GRANULOCYTES # BLD AUTO: 0 X10E3/UL (ref 0–0.1)
IMM GRANULOCYTES NFR BLD AUTO: 0 %
LDLC SERPL CALC-MCNC: 82 MG/DL (ref 0–99)
LYMPHOCYTES # BLD AUTO: 2.4 X10E3/UL (ref 0.7–3.1)
LYMPHOCYTES NFR BLD AUTO: 33 %
MCH RBC QN AUTO: 28.5 PG (ref 26.6–33)
MCHC RBC AUTO-ENTMCNC: 31 G/DL (ref 31.5–35.7)
MCV RBC AUTO: 92 FL (ref 79–97)
MONOCYTES # BLD AUTO: 0.6 X10E3/UL (ref 0.1–0.9)
MONOCYTES NFR BLD AUTO: 8 %
NEUTROPHILS # BLD AUTO: 4 X10E3/UL (ref 1.4–7)
NEUTROPHILS NFR BLD AUTO: 54 %
PLATELET # BLD AUTO: 361 X10E3/UL (ref 150–450)
POTASSIUM SERPL-SCNC: 3.9 MMOL/L (ref 3.5–5.2)
PROT SERPL-MCNC: 7 G/DL (ref 6–8.5)
RBC # BLD AUTO: 4.39 X10E6/UL (ref 3.77–5.28)
SODIUM SERPL-SCNC: 139 MMOL/L (ref 134–144)
T4 FREE SERPL-MCNC: 1.15 NG/DL (ref 0.82–1.77)
TRIGL SERPL-MCNC: 120 MG/DL (ref 0–149)
TSH SERPL DL<=0.005 MIU/L-ACNC: 2.5 UIU/ML (ref 0.45–4.5)
VLDLC SERPL CALC-MCNC: 22 MG/DL (ref 5–40)
WBC # BLD AUTO: 7.4 X10E3/UL (ref 3.4–10.8)

## 2025-07-31 DIAGNOSIS — E66.09 CLASS 2 OBESITY DUE TO EXCESS CALORIES WITH BODY MASS INDEX (BMI) OF 35.0 TO 35.9 IN ADULT, UNSPECIFIED WHETHER SERIOUS COMORBIDITY PRESENT: ICD-10-CM

## 2025-07-31 DIAGNOSIS — E66.812 CLASS 2 OBESITY DUE TO EXCESS CALORIES WITH BODY MASS INDEX (BMI) OF 35.0 TO 35.9 IN ADULT, UNSPECIFIED WHETHER SERIOUS COMORBIDITY PRESENT: ICD-10-CM

## 2025-07-31 RX ORDER — TIRZEPATIDE 10 MG/.5ML
10 INJECTION, SOLUTION SUBCUTANEOUS
Qty: 6 ML | Refills: 0 | Status: SHIPPED | OUTPATIENT
Start: 2025-07-31

## 2025-08-22 ENCOUNTER — APPOINTMENT (OUTPATIENT)
Dept: URBAN - METROPOLITAN AREA CLINIC 374 | Facility: CLINIC | Age: 50
Setting detail: DERMATOLOGY
End: 2025-08-22

## 2025-08-22 DIAGNOSIS — L21.8 OTHER SEBORRHEIC DERMATITIS: ICD-10-CM

## 2025-08-22 DIAGNOSIS — L65.0 TELOGEN EFFLUVIUM: ICD-10-CM | Status: INADEQUATELY CONTROLLED

## 2025-08-22 DIAGNOSIS — L91.8 OTHER HYPERTROPHIC DISORDERS OF THE SKIN: ICD-10-CM

## 2025-08-22 PROCEDURE — ? COUNSELING

## 2025-08-22 PROCEDURE — ? PRESCRIPTION MEDICATION MANAGEMENT

## 2025-08-22 PROCEDURE — ? TREATMENT GOALS

## 2025-08-22 PROCEDURE — ? SHAVE REMOVAL

## 2025-08-22 PROCEDURE — ? PRESCRIPTION

## 2025-08-22 PROCEDURE — ? DIAGNOSIS COMMENT

## 2025-08-22 PROCEDURE — ? OTC TREATMENT REGIMEN

## 2025-08-22 RX ORDER — KETOCONAZOLE 20 MG/ML
SHAMPOO, SUSPENSION TOPICAL
Qty: 120 | Refills: 6 | Status: ERX | COMMUNITY
Start: 2025-08-22

## 2025-08-22 RX ORDER — CLOBETASOL PROPIONATE 0.5 MG/ML
SOLUTION TOPICAL BID
Qty: 50 | Refills: 6 | Status: ERX | COMMUNITY
Start: 2025-08-22

## 2025-08-22 RX ADMIN — CLOBETASOL PROPIONATE: 0.5 SOLUTION TOPICAL at 00:00

## 2025-08-22 RX ADMIN — KETOCONAZOLE: 20 SHAMPOO, SUSPENSION TOPICAL at 00:00

## 2025-08-22 ASSESSMENT — LOCATION DETAILED DESCRIPTION DERM
LOCATION DETAILED: POSTERIOR MID-PARIETAL SCALP
LOCATION DETAILED: RIGHT INFERIOR OCCIPITAL SCALP
LOCATION DETAILED: RIGHT RIB CAGE
LOCATION DETAILED: LEFT CENTRAL OCCIPITAL SCALP

## 2025-08-22 ASSESSMENT — LOCATION SIMPLE DESCRIPTION DERM
LOCATION SIMPLE: SCALP
LOCATION SIMPLE: POSTERIOR SCALP
LOCATION SIMPLE: ABDOMEN

## 2025-08-22 ASSESSMENT — LOCATION ZONE DERM
LOCATION ZONE: TRUNK
LOCATION ZONE: SCALP

## 2025-08-22 ASSESSMENT — SEVERITY ASSESSMENT: HOW SEVERE IS THIS PATIENT'S CONDITION?: MODERATE
